# Patient Record
Sex: FEMALE | Race: WHITE
[De-identification: names, ages, dates, MRNs, and addresses within clinical notes are randomized per-mention and may not be internally consistent; named-entity substitution may affect disease eponyms.]

---

## 2017-01-27 ENCOUNTER — HOSPITAL ENCOUNTER (OUTPATIENT)
Dept: HOSPITAL 47 - RADUSWWP | Age: 42
Discharge: HOME | End: 2017-01-27
Payer: COMMERCIAL

## 2017-01-27 DIAGNOSIS — Z85.3: ICD-10-CM

## 2017-01-27 DIAGNOSIS — E01.0: Primary | ICD-10-CM

## 2017-01-27 PROCEDURE — 76536 US EXAM OF HEAD AND NECK: CPT

## 2017-01-27 NOTE — US
EXAMINATION TYPE: US thyroid st tissue head/neck

 

DATE OF EXAM: 1/27/2017 2:39 PM

 

COMPARISON: NONE

 

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

 

GLAND SIZE:

 

Right Lobe: 4.6 x 1.5 x 1.6 cm

Left Lobe: 4.1 x 1.2 x 1.5 cm

Isthmus Thickness:  0.2 cm

 

There is homogeneous glandular parenchyma without discrete nodule.

 

 

 

IMPRESSION:

Thyroid gland measurements as above. No discrete nodule.

## 2018-09-25 ENCOUNTER — HOSPITAL ENCOUNTER (OUTPATIENT)
Dept: HOSPITAL 47 - PROCWHC3 | Age: 43
End: 2018-09-25
Attending: INTERNAL MEDICINE
Payer: COMMERCIAL

## 2018-09-25 VITALS
RESPIRATION RATE: 16 BRPM | HEART RATE: 84 BPM | SYSTOLIC BLOOD PRESSURE: 154 MMHG | TEMPERATURE: 97.9 F | DIASTOLIC BLOOD PRESSURE: 101 MMHG

## 2018-09-25 DIAGNOSIS — C50.412: Primary | ICD-10-CM

## 2018-09-25 PROCEDURE — 96372 THER/PROPH/DIAG INJ SC/IM: CPT

## 2018-11-12 ENCOUNTER — HOSPITAL ENCOUNTER (OUTPATIENT)
Dept: HOSPITAL 47 - RADMAMWWP | Age: 43
Discharge: HOME | End: 2018-11-12
Attending: INTERNAL MEDICINE
Payer: COMMERCIAL

## 2018-11-12 DIAGNOSIS — Z08: Primary | ICD-10-CM

## 2018-11-12 DIAGNOSIS — Z85.3: ICD-10-CM

## 2018-11-12 PROCEDURE — 77061 BREAST TOMOSYNTHESIS UNI: CPT

## 2018-11-12 PROCEDURE — 77065 DX MAMMO INCL CAD UNI: CPT

## 2018-11-12 NOTE — MM
Reason for exam: additional evaluation requested from prior study.

Last mammogram was performed 1 year ago.



History:

Patient has history of breast cancer at age 37.

Family history of breast cancer in maternal aunt.

Benign US biopsy breast VAD RT of the right breast, November 28, 2014.  Mastectomy

of the left breast, June 5, 2012.  Malignant left mammotome panel of the left 

breast, May 9, 2012.  Implant in the left breast, 2012.  Breast lift of the right 

breast, 2012.  Reconstruction of the left breast, 2012.

Taking tamoxifen for 1 year beginning at age 37.



Physical Findings:

Nurse did not find any significant physical abnormalities on exam.



MG 3D Diag Mammo W/Cad RT

CC and MLO view(s) were taken of the right breast.

Prior study comparison: November 6, 2017, right breast MG 3d diag mammo w/cad RT. 

November 2, 2016, right breast MG diagnostic mammo RT w CAD.

There are scattered fibroglandular densities.  No significant new findings when 

compared with previous films.



These results were verbally communicated with the patient and result sheet given 

to the patient on 11/12/18.





ASSESSMENT: Benign, BI-RAD 2



RECOMMENDATION:

Follow-up diagnostic mammogram of the right breast in 1 year.

## 2018-12-27 ENCOUNTER — HOSPITAL ENCOUNTER (OUTPATIENT)
Dept: HOSPITAL 47 - PROCWHC3 | Age: 43
End: 2018-12-27
Attending: INTERNAL MEDICINE
Payer: COMMERCIAL

## 2018-12-27 VITALS
RESPIRATION RATE: 16 BRPM | SYSTOLIC BLOOD PRESSURE: 132 MMHG | TEMPERATURE: 97.7 F | DIASTOLIC BLOOD PRESSURE: 89 MMHG | HEART RATE: 86 BPM

## 2018-12-27 DIAGNOSIS — C50.412: ICD-10-CM

## 2018-12-27 DIAGNOSIS — Z51.11: Primary | ICD-10-CM

## 2018-12-27 PROCEDURE — 96402 CHEMO HORMON ANTINEOPL SQ/IM: CPT

## 2019-04-08 ENCOUNTER — HOSPITAL ENCOUNTER (OUTPATIENT)
Dept: HOSPITAL 47 - RADBDWWP | Age: 44
Discharge: HOME | End: 2019-04-08
Attending: INTERNAL MEDICINE
Payer: COMMERCIAL

## 2019-04-08 DIAGNOSIS — C50.412: Primary | ICD-10-CM

## 2019-04-08 DIAGNOSIS — Z79.890: ICD-10-CM

## 2019-04-08 DIAGNOSIS — M85.88: ICD-10-CM

## 2019-04-08 PROCEDURE — 77080 DXA BONE DENSITY AXIAL: CPT

## 2019-04-08 NOTE — BD
EXAMINATION TYPE: Axial Bone Density

 

DATE OF EXAM: 4/8/2019

 

COMPARISON: NONE

 

CLINICAL HISTORY: C 50.412

 

Height:  5'6

Weight:  197

 

FRAX RISK QUESTIONS:

 

Secondary Osteoporosis:

    3.  Menopause before 45:   n 

 

 

RISK FACTORS 

HISTORY OF: 

Family History of Osteoporosis: y

Post menopausal: Y

 

MEDICATIONS: 

Additional Medications: breast cancer not tamoxifen 

 

 

Additional History: breast  cancer 6 years ago, 

 

 

EXAM MEASUREMENTS: 

Bone mineral densitometry was performed using the [facility].

Bone mineral density as measured about the Lumbar spine is:  

----- L1-L4(G/cm2): 1.001

T Score Values are as follows:

----- L2: -1.4

----- L3: -1.5

----- L4:-2.4

----- L1-L4: -1.5

 

Bone mineral density about the R hip (g/cm2): 1.139

Bone mineral density about the L hip (g/cm2): 1.055

T Score values are as follows:

-----R Neck: 0.7

-----L Neck: 0.1

-----R Total: 0.8

-----L Total: 0.6

 

IMPRESSION:

Osteopenia (T Score between -2.5 and -1).

 

There is slightly increased risk of fracture and the patient may be considered 

for treatment. 

 

Re-Screen 2-5 years.

 

 

 

 

 

NOTE:  T-SCORE=SD OF THE YOUNG ADULT MEAN.

## 2019-06-16 ENCOUNTER — HOSPITAL ENCOUNTER (EMERGENCY)
Dept: HOSPITAL 47 - EC | Age: 44
Discharge: HOME | End: 2019-06-16
Payer: COMMERCIAL

## 2019-06-16 VITALS
SYSTOLIC BLOOD PRESSURE: 134 MMHG | DIASTOLIC BLOOD PRESSURE: 91 MMHG | HEART RATE: 90 BPM | RESPIRATION RATE: 20 BRPM | TEMPERATURE: 98.1 F

## 2019-06-16 DIAGNOSIS — R93.89: ICD-10-CM

## 2019-06-16 DIAGNOSIS — Z90.12: ICD-10-CM

## 2019-06-16 DIAGNOSIS — Z85.3: ICD-10-CM

## 2019-06-16 DIAGNOSIS — F17.200: ICD-10-CM

## 2019-06-16 DIAGNOSIS — R10.2: ICD-10-CM

## 2019-06-16 DIAGNOSIS — N93.9: Primary | ICD-10-CM

## 2019-06-16 LAB
ALBUMIN SERPL-MCNC: 4 G/DL (ref 3.5–5)
ALP SERPL-CCNC: 84 U/L (ref 38–126)
ALT SERPL-CCNC: 25 U/L (ref 9–52)
ANION GAP SERPL CALC-SCNC: 5 MMOL/L
APTT BLD: 24.8 SEC (ref 22–30)
AST SERPL-CCNC: 22 U/L (ref 14–36)
BASOPHILS # BLD AUTO: 0 K/UL (ref 0–0.2)
BASOPHILS NFR BLD AUTO: 0 %
BUN SERPL-SCNC: 20 MG/DL (ref 7–17)
CALCIUM SPEC-MCNC: 9.1 MG/DL (ref 8.4–10.2)
CHLORIDE SERPL-SCNC: 108 MMOL/L (ref 98–107)
CO2 SERPL-SCNC: 26 MMOL/L (ref 22–30)
EOSINOPHIL # BLD AUTO: 0.2 K/UL (ref 0–0.7)
EOSINOPHIL NFR BLD AUTO: 2 %
ERYTHROCYTE [DISTWIDTH] IN BLOOD BY AUTOMATED COUNT: 4.82 M/UL (ref 3.8–5.4)
ERYTHROCYTE [DISTWIDTH] IN BLOOD: 13.1 % (ref 11.5–15.5)
GLUCOSE SERPL-MCNC: 105 MG/DL (ref 74–99)
HCT VFR BLD AUTO: 43.4 % (ref 34–46)
HGB BLD-MCNC: 13.7 GM/DL (ref 11.4–16)
INR PPP: 0.9 (ref ?–1.2)
LYMPHOCYTES # SPEC AUTO: 3.3 K/UL (ref 1–4.8)
LYMPHOCYTES NFR SPEC AUTO: 36 %
MCH RBC QN AUTO: 28.4 PG (ref 25–35)
MCHC RBC AUTO-ENTMCNC: 31.5 G/DL (ref 31–37)
MCV RBC AUTO: 90.1 FL (ref 80–100)
MONOCYTES # BLD AUTO: 0.6 K/UL (ref 0–1)
MONOCYTES NFR BLD AUTO: 7 %
NEUTROPHILS # BLD AUTO: 4.9 K/UL (ref 1.3–7.7)
NEUTROPHILS NFR BLD AUTO: 52 %
PH UR: 5 [PH] (ref 5–8)
PLATELET # BLD AUTO: 367 K/UL (ref 150–450)
POTASSIUM SERPL-SCNC: 4.3 MMOL/L (ref 3.5–5.1)
PROT SERPL-MCNC: 6.9 G/DL (ref 6.3–8.2)
PT BLD: 9.9 SEC (ref 9–12)
RBC UR QL: 8 /HPF (ref 0–5)
SODIUM SERPL-SCNC: 139 MMOL/L (ref 137–145)
SP GR UR: 1.02 (ref 1–1.03)
SQUAMOUS UR QL AUTO: 2 /HPF (ref 0–4)
UROBILINOGEN UR QL STRIP: <2 MG/DL (ref ?–2)
WBC # BLD AUTO: 9.3 K/UL (ref 3.8–10.6)
WBC #/AREA URNS HPF: 6 /HPF (ref 0–5)

## 2019-06-16 PROCEDURE — 36415 COLL VENOUS BLD VENIPUNCTURE: CPT

## 2019-06-16 PROCEDURE — 80053 COMPREHEN METABOLIC PANEL: CPT

## 2019-06-16 PROCEDURE — 85730 THROMBOPLASTIN TIME PARTIAL: CPT

## 2019-06-16 PROCEDURE — 96374 THER/PROPH/DIAG INJ IV PUSH: CPT

## 2019-06-16 PROCEDURE — 81001 URINALYSIS AUTO W/SCOPE: CPT

## 2019-06-16 PROCEDURE — 85610 PROTHROMBIN TIME: CPT

## 2019-06-16 PROCEDURE — 99284 EMERGENCY DEPT VISIT MOD MDM: CPT

## 2019-06-16 PROCEDURE — 81025 URINE PREGNANCY TEST: CPT

## 2019-06-16 PROCEDURE — 76830 TRANSVAGINAL US NON-OB: CPT

## 2019-06-16 PROCEDURE — 96361 HYDRATE IV INFUSION ADD-ON: CPT

## 2019-06-16 PROCEDURE — 85025 COMPLETE CBC W/AUTO DIFF WBC: CPT

## 2019-06-16 NOTE — US
EXAMINATION TYPE: US transvaginal

 

DATE OF EXAM: 6/16/2019

 

COMPARISON: Previous study dated 12/15/2017.

 

CLINICAL HISTORY: Pain.

Patient in forced menopause on left hand.

Patient has breast ca and is currently taking medication for that as well. 

 

TECHNIQUE:  Transvaginal (TV).  

 

Date of LMP:  1 1/2 years prior.

 

EXAM MEASUREMENTS:

 

Uterus:  8.7 x 4.1 x 4.7 cm

Endometrial Stripe: 1.6 cm

Right Ovary:  1.9 x 1.3 x 1.4 cm

Left Ovary: not visualized

 

1. Uterus:  Anteverted   wnl

2. Endometrium:  thickened

3. Right Ovary:  wnl

4. Left Ovary:  Obscured by overlying bowel gas

. Bilateral Adnexa:  wnl

6. Posterior cul-de-sac: mild amount of ff

 

IMPRESSION: 

1. THICKENED ENDOMETRIUM. THIS MAY BE DUE TO HORMONAL THERAPY. ENDOMETRIAL CANCER WITH NEED TO BE RUL
ED OUT.

2. SMALL AMOUNT OF FREE FLUID.

## 2019-06-18 ENCOUNTER — HOSPITAL ENCOUNTER (OUTPATIENT)
Dept: HOSPITAL 47 - LABPAT | Age: 44
Discharge: HOME | End: 2019-06-18
Attending: OBSTETRICS & GYNECOLOGY
Payer: COMMERCIAL

## 2019-06-18 DIAGNOSIS — Z01.812: Primary | ICD-10-CM

## 2019-06-18 DIAGNOSIS — N93.9: ICD-10-CM

## 2019-06-18 LAB
BASOPHILS # BLD AUTO: 0.1 K/UL (ref 0–0.2)
BASOPHILS NFR BLD AUTO: 0 %
EOSINOPHIL # BLD AUTO: 0.4 K/UL (ref 0–0.7)
EOSINOPHIL NFR BLD AUTO: 3 %
ERYTHROCYTE [DISTWIDTH] IN BLOOD BY AUTOMATED COUNT: 4.81 M/UL (ref 3.8–5.4)
ERYTHROCYTE [DISTWIDTH] IN BLOOD: 13.8 % (ref 11.5–15.5)
HCT VFR BLD AUTO: 44.7 % (ref 34–46)
HGB BLD-MCNC: 13.9 GM/DL (ref 11.4–16)
LYMPHOCYTES # SPEC AUTO: 3.9 K/UL (ref 1–4.8)
LYMPHOCYTES NFR SPEC AUTO: 35 %
MCH RBC QN AUTO: 29 PG (ref 25–35)
MCHC RBC AUTO-ENTMCNC: 31.2 G/DL (ref 31–37)
MCV RBC AUTO: 92.9 FL (ref 80–100)
MONOCYTES # BLD AUTO: 0.8 K/UL (ref 0–1)
MONOCYTES NFR BLD AUTO: 7 %
NEUTROPHILS # BLD AUTO: 5.8 K/UL (ref 1.3–7.7)
NEUTROPHILS NFR BLD AUTO: 52 %
PLATELET # BLD AUTO: 404 K/UL (ref 150–450)
WBC # BLD AUTO: 11.2 K/UL (ref 3.8–10.6)

## 2019-06-18 PROCEDURE — 85025 COMPLETE CBC W/AUTO DIFF WBC: CPT

## 2019-06-18 PROCEDURE — 36415 COLL VENOUS BLD VENIPUNCTURE: CPT

## 2019-07-01 ENCOUNTER — HOSPITAL ENCOUNTER (OUTPATIENT)
Dept: HOSPITAL 47 - OR | Age: 44
End: 2019-07-01
Attending: OBSTETRICS & GYNECOLOGY
Payer: COMMERCIAL

## 2019-07-01 VITALS — HEART RATE: 64 BPM | SYSTOLIC BLOOD PRESSURE: 113 MMHG | DIASTOLIC BLOOD PRESSURE: 72 MMHG

## 2019-07-01 VITALS — RESPIRATION RATE: 18 BRPM

## 2019-07-01 VITALS — BODY MASS INDEX: 28.1 KG/M2

## 2019-07-01 VITALS — TEMPERATURE: 97 F

## 2019-07-01 DIAGNOSIS — N81.6: ICD-10-CM

## 2019-07-01 DIAGNOSIS — Z80.49: ICD-10-CM

## 2019-07-01 DIAGNOSIS — Z79.890: ICD-10-CM

## 2019-07-01 DIAGNOSIS — N92.6: ICD-10-CM

## 2019-07-01 DIAGNOSIS — N81.10: ICD-10-CM

## 2019-07-01 DIAGNOSIS — N84.0: Primary | ICD-10-CM

## 2019-07-01 DIAGNOSIS — Z90.12: ICD-10-CM

## 2019-07-01 DIAGNOSIS — Z87.891: ICD-10-CM

## 2019-07-01 DIAGNOSIS — Z98.51: ICD-10-CM

## 2019-07-01 DIAGNOSIS — Z83.3: ICD-10-CM

## 2019-07-01 DIAGNOSIS — N93.9: ICD-10-CM

## 2019-07-01 DIAGNOSIS — Z85.3: ICD-10-CM

## 2019-07-01 PROCEDURE — 81025 URINE PREGNANCY TEST: CPT

## 2019-07-01 PROCEDURE — 58558 HYSTEROSCOPY BIOPSY: CPT

## 2019-07-01 PROCEDURE — 88305 TISSUE EXAM BY PATHOLOGIST: CPT

## 2019-07-01 NOTE — P.OP
Date of Procedure: 07/01/19


Preoperative Diagnosis: 


Irregular uterine bleeding


Postoperative Diagnosis: 


Endometrial polyp, grade 2 rectocele, grade 2 cystocele


Procedure(s) Performed: 


Hysteroscopy, dilatation and curettage of the uterine cavity, polypectomy


Anesthesia: BUTCH


Surgeon: Willow Baker


Estimated Blood Loss (ml): 10


IV fluids (ml): 600


Urine output (ml): 20


Pathology: other (Endometrial curettings and polyp)


Condition: stable


Disposition: PACU


Operative Findings: 


Grade 2 rectocele, grade 2 cystocele, 1 dominant endometrial polyp


Description of Procedure: 


Patient is brought to the operating suite where general anesthetic is 

administered without difficulty.  She's placed in the dorsal lithotomy position.

 The appropriate timeout was performed to assure proper patient and procedural 

identification.  Urine hCG is negative.  The cervix, vagina, perineal bodies are

all prepped and draped in the usual sterile fashion.  The bladder is drained for

approximately 20 cc of clear yellow urine.  The weighted speculum was placed 

into the vagina.  The anterior lip of the cervix is grasped with a double-tooth 

tenaculum.





Uterus sounds to a depth of 7 cm in the anteverted position.  The cervix was 

gently and carefully dilated using Hanks dilators.  The hysteroscope was placed 

and fluid is infused.  The cavity is well distended and inspected.  There is a 

smooth walled, rounded polyp noted in the lower uterine segment at approximately

3:00.  The remaining view of the cavity is negative.  The hysteroscope was 

removed.  A small sharp curette is used and the cavity is gently and 

systematically curetted.  The endometrial polyp is removed.  This is sent to 

pathology for evaluation.





The hysteroscope was then reintroduced, and the cavity is noted to be clear with

no additional polyps, septa, fibroids or defects.  The double-tooth tenaculum is

also removed.  A small figure-of-eight suture of 3-0 Vicryl is used on the 

perforation site for a small amount of bleeding with excellent results.  All 

sponge needle and enhancement counts are correct.  Patient is brought back to 

the recovery room in excellent condition with stable vital signs including a 

blood pressure of 115/72, pulse 73, 97% O2 saturation.  She is given Toradol 

prior to leaving the operative suite.  She will follow-up with me in the office 

in 2 weeks.

## 2019-10-29 ENCOUNTER — HOSPITAL ENCOUNTER (OUTPATIENT)
Dept: HOSPITAL 47 - RADXRMAIN | Age: 44
Discharge: HOME | End: 2019-10-29
Payer: COMMERCIAL

## 2019-10-29 ENCOUNTER — HOSPITAL ENCOUNTER (EMERGENCY)
Dept: HOSPITAL 47 - EC | Age: 44
Discharge: HOME | End: 2019-10-29
Payer: COMMERCIAL

## 2019-10-29 VITALS — DIASTOLIC BLOOD PRESSURE: 73 MMHG | SYSTOLIC BLOOD PRESSURE: 142 MMHG | TEMPERATURE: 98 F | HEART RATE: 80 BPM

## 2019-10-29 VITALS — RESPIRATION RATE: 18 BRPM
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
## 2019-10-29 DIAGNOSIS — Z90.12: ICD-10-CM

## 2019-10-29 DIAGNOSIS — Z85.3: ICD-10-CM
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
## 2019-10-29 DIAGNOSIS — S60.211A: ICD-10-CM
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
## 2019-10-29 DIAGNOSIS — S60.221A: ICD-10-CM

## 2019-10-29 DIAGNOSIS — S63.501A: Primary | ICD-10-CM

## 2019-10-29 DIAGNOSIS — F17.200: ICD-10-CM

## 2019-10-29 DIAGNOSIS — S50.01XA: Primary | ICD-10-CM

## 2019-10-29 DIAGNOSIS — Z79.899: ICD-10-CM

## 2019-10-29 PROCEDURE — 73200 CT UPPER EXTREMITY W/O DYE: CPT

## 2019-10-29 PROCEDURE — 96372 THER/PROPH/DIAG INJ SC/IM: CPT

## 2019-10-29 PROCEDURE — 29125 APPL SHORT ARM SPLINT STATIC: CPT

## 2019-10-29 PROCEDURE — 99284 EMERGENCY DEPT VISIT MOD MDM: CPT

## 2019-10-29 NOTE — XR
EXAMINATION TYPE: XR wrist complete RT, XR forearm RT, XR hand complete RT

 

DATE OF EXAM: 10/29/2019

 

CLINICAL HISTORY: Right wrist pain after fall and right hand pain as well as right forearm pain.

 

TECHNIQUE:  Frontal, lateral and oblique images of the right wrist are obtained. Scaphoid view was ob
tained. 2 views of the right forearm are obtained. 3 views of the right hand were obtained.

 

COMPARISON: None

 

FINDINGS:  There is no acute fracture/dislocation evident in the right wrist.  The joint spaces in th
e right wrist appear within normal limits.  The overlying soft tissue appears unremarkable. Slight ne
gative ulnar variance is seen.

 

No acute fracture or dislocation is seen in the right hand. Suboptimal visualization of the fourth an
d fifth digit on the oblique view due to patient positioning. Minimal degenerative change of the firs
t carpometacarpal joint and distal interphalangeal joints.

 

No acute fracture or dislocation is seen of the right forearm. No radiopaque foreign body. No joint e
ffusion of the elbow. No focal soft tissue swelling.

 

IMPRESSION:  There is no acute fracture or dislocation in the right wrist, forearm, nor hand. Given t
he patient's severe pain correlate for point tenderness to determine need for CT and evaluation for o
ccult fracture or repeat radiograph in 7-10 days.

## 2019-10-29 NOTE — CT
EXAMINATION TYPE: CT wrist RT wo con

 

DATE OF EXAM: 10/29/2019

 

COMPARISON: Radiograph same day

 

HISTORY: 44-year-old female trauma, severe pain

 

TECHNIQUE: Contiguous axial scanning of the right wrist without IV contrast. Coronal and sagittal rec
onstructions performed.

 

CT DLP: 107.1 mGycm

Automated exposure control for dose reduction was used.

 

 

FINDINGS: 

There is some generalized soft tissue swelling about the hand.

 

Subtle cortical lucency along the lateral scaphoid marginating the radial carpal joint, refer to sanjiv
nal image 21 and sagittal image 37.

 

Tiny spur at the volar base of the first metacarpal.

 

Otherwise, no acute fracture, subluxation, or dislocation seen.

 

 

IMPRESSION: 

 

1. SOME GENERALIZED SOFT TISSUE SWELLING ABOUT THE HAND.

2. SUBTLE CORTICAL LUCENCY ALONG THE LATERAL SCAPHOID MARGINATING THE RADIOCARPAL JOINT, CORONAL IMAG
E 21 AND SAGITTAL IMAGE 37. EQUIVOCAL BETWEEN a NUTRIENT VESSEL FORAMEN VERSUS SUBTLE NONDISPLACED CO
RTICAL FRACTURE. CORRELATE FOR ANY PINPOINT TENDERNESS HERE.

## 2019-10-29 NOTE — ED
General Adult HPI





- General


Chief complaint: Extremity Injury, Upper


Stated complaint: [facility]-Hand Injury


Time Seen by Provider: 10/29/19 14:35


Source: patient


Mode of arrival: ambulatory


Limitations: no limitations





- History of Present Illness


Initial comments: 





Patient is a 44-year-old female presenting to the emergency department with 

chief complaint of right arm pain.  Patient reports yesterday she was at work 

when she tripped and fell on the right side causing her right arm to be pinned 

between the ground and her body.  Patient reports the pain is originating at the

right breast and he is radiating distally and proximally along the arm.  Patient

reports limited range of motion in the right wrist with the inability to flex or

extend it.  Patient reports the pain is a 9 and throbbing.  Patient denies any 

numbness or tingling.  Patient went to [facility] who ordered x-rays.  She had x-rays 

obtained today who were indicated of no fractures however the radiologist 

suggested a CT to perform which is why she was sent to the ED.  Patient reports 

she still continues to have a lot of pain and no improvement in her symptoms.  

She really has an appointment scheduled tomorrow with orthopedic specialist.





- Related Data


                                Home Medications











 Medication  Instructions  Recorded  Confirmed


 


Multivitamins, Thera [Multivitamin 1 tab PO DAILY 12/15/17 07/01/19





(formulary)]   


 


Letrozole 2.5 mg PO DAILY 19











                                    Allergies











Allergy/AdvReac Type Severity Reaction Status Date / Time


 


No Known Allergies Allergy   Verified 10/29/19 14:28














Review of Systems


ROS Statement: 


Those systems with pertinent positive or pertinent negative responses have been 

documented in the HPI.





ROS Other: All systems not noted in ROS Statement are negative.





Past Medical History


Past Medical History: Cancer


Additional Past Medical History / Comment(s): Breast CA


History of Any Multi-Drug Resistant Organisms: None Reported


Past Surgical History: Breast Surgery,  Section, Tubal Ligation


Additional Past Surgical History / Comment(s): LEFT MASTECTOMY


Past Anesthesia/Blood Transfusion Reactions: No Reported Reaction


Past Psychological History: No Psychological Hx Reported


Smoking Status: Current some day smoker


Past Alcohol Use History: None Reported


Past Drug Use History: None Reported





General Exam


Limitations: no limitations


General appearance: alert, in no apparent distress


Head exam: Present: atraumatic, normocephalic, normal inspection


Eye exam: Present: normal appearance


Pupils: Present: normal accommodation


ENT exam: Present: normal exam, mucous membranes moist, normal external ear exam


Neck exam: Present: normal inspection


Respiratory exam: Present: normal lung sounds bilaterally


Cardiovascular Exam: Present: regular rate, normal rhythm, normal heart sounds


Extremities exam: Present: tenderness (Tenderness along the whole wrist.  

Anatomical snuffbox tenderness.  Distal forearm tenderness), normal capillary 

refill, other (+2 ulnar and radial pulses bilaterally.).  Absent: normal 

inspection (Very Mild swelling at the right wrist, no ecchymosis, discoloration 

or signs of trauma.), full ROM (Unable to flex or extend the wrist)


Back exam: Present: normal inspection, full ROM


Neurological exam: Present: alert, oriented X3


Psychiatric exam: Present: normal affect, normal mood


Skin exam: Present: warm, intact, normal color





Course


                                   Vital Signs











  10/29/19 10/29/19





  14:28 16:21


 


Temperature 97.7 F 98.0 F


 


Pulse Rate 86 80


 


Respiratory 18 18





Rate  


 


Blood Pressure 159/71 142/73


 


O2 Sat by Pulse 98 98





Oximetry  














Procedures





- Orthopedic Splinting/Casting


  ** Injury #1


Side: right


Upper Extremity Injury Location: wrist


Upper Extremity Immobilizer: thumb spica, Ace wrap, synthetic pre-padded splint





Medical Decision Making





- Medical Decision Making





Patient is a 44-year-old female presenting to the emergency department with a 

chief complaint of right arm pain.  Patient was sent for x-ray imaging of the 

right upper extremity by [facility].  X-rays are negative for acute fracture or 

dislocations, however the patient continues to have severe pain.  The 

radiologist suggested CT imaging.  CT of the right wrist is indeterminate of the

fracture versus possible vessel damage.  A physical examination patient does 

have normal capillary refill with +2 radial pulses in that hand.  On physical 

examination no signs of vascular damage.  Patient already has an appointment 

scheduled with orthopedics.  Patient declined narcotic analgesia.  Patient was 

given Toradol.  Patient advised to follow-up with orthopedics.  Strict return 

parameters were thoroughly discussed with patient is understanding and 

agreeable.  Thumb spica applied.  Case discussed with physician.





Disposition


Clinical Impression: 


 Sprain of wrist, right





Disposition: HOME SELF-CARE


Condition: Stable


Instructions (If sedation given, give patient instructions):  Wrist Injury (ED)


Additional Instructions: 


Please follow up with orthopedics tomorrow.  Please return to emergency 

department if symptoms worsen.  Please take medication as directed.


Is patient prescribed a controlled substance at d/c from ED?: No


Referrals: 


Farideh Shepard MD [Primary Care Provider] - 1-2 days


Osbaldo Hung MD [STAFF PHYSICIAN] - 1-2 days


Time of Disposition: 16:13

## 2019-11-13 ENCOUNTER — HOSPITAL ENCOUNTER (OUTPATIENT)
Dept: HOSPITAL 47 - RADMAMWWP | Age: 44
Discharge: HOME | End: 2019-11-13
Attending: INTERNAL MEDICINE
Payer: COMMERCIAL

## 2019-11-13 DIAGNOSIS — R92.8: ICD-10-CM

## 2019-11-13 DIAGNOSIS — Z85.3: ICD-10-CM

## 2019-11-13 DIAGNOSIS — Z08: Primary | ICD-10-CM

## 2019-11-13 PROCEDURE — 77065 DX MAMMO INCL CAD UNI: CPT

## 2019-11-13 PROCEDURE — 77061 BREAST TOMOSYNTHESIS UNI: CPT

## 2019-11-13 NOTE — MM
Reason for exam: additional evaluation requested from prior study.

Last mammogram was performed 1 year ago.



History:

Patient has history of breast cancer at age 37.

Family history of breast cancer in maternal aunt at age 50.

Benign US biopsy breast VAD RT of the right breast, November 28, 2014.  Mastectomy

of the left breast, June 5, 2012.  Malignant left mammotome panel of the left 

breast, May 9, 2012.  Implant in the left breast, 2012.  Breast lift of the right 

breast, 2012.  Reconstruction of the left breast, 2012.

Taking tamoxifen for 7 years beginning at age 37.



Physical Findings:

Nurse did not find any significant physical abnormalities on exam.



MG 3D Diag Mammo W/Cad RT

CC and MLO view(s) were taken of the right breast.

Prior study comparison: November 12, 2018, right breast MG 3d diag mammo w/cad RT.

November 6, 2017, right breast MG 3d diag mammo w/cad RT.

There are scattered fibroglandular densities.  Benign appearing calcifications in 

the right breast. No suspicious abnormality.



These results were verbally communicated with the patient and result sheet given 

to the patient on 11/13/19.





ASSESSMENT: Incomplete: need additional imaging evaluation, BI-RAD 0



RECOMMENDATION:

Ultrasound of the right breast.

(pain lower inner quadrant)

## 2019-11-13 NOTE — USB
Reason for exam: additional evaluation requested from abnormal screening.



History:

Patient has history of breast cancer at age 37.

Family history of breast cancer in maternal aunt at age 50.

Benign US biopsy breast VAD RT of the right breast, November 28, 2014.  Mastectomy

of the left breast, June 5, 2012.  Malignant left mammotome panel of the left 

breast, May 9, 2012.  Implant in the left breast, 2012.  Breast lift of the right 

breast, 2012.  Reconstruction of the left breast, 2012.

Taking tamoxifen for 7 years beginning at age 37.



US Breast Limited RT

Right limited breast ultrasound including focal area of concern, retroareolar and 

axilla demonstrates no cystic or solid lesion seen. No suspicious sonographic 

finding.



These results were verbally communicated with the patient and result sheet given 

to the patient on 11/13/19.





ASSESSMENT: Negative, BI-RAD 1



RECOMMENDATION:

Follow-up diagnostic mammogram of the right breast in 1 year.

## 2020-02-13 ENCOUNTER — HOSPITAL ENCOUNTER (OUTPATIENT)
Dept: HOSPITAL 47 - RADUSWWP | Age: 45
Discharge: HOME | End: 2020-02-13
Attending: FAMILY MEDICINE
Payer: COMMERCIAL

## 2020-02-13 DIAGNOSIS — E04.1: Primary | ICD-10-CM

## 2020-02-13 PROCEDURE — 76536 US EXAM OF HEAD AND NECK: CPT

## 2020-08-14 ENCOUNTER — HOSPITAL ENCOUNTER (OUTPATIENT)
Dept: HOSPITAL 47 - RADUSWWP | Age: 45
Discharge: HOME | End: 2020-08-14
Attending: FAMILY MEDICINE
Payer: COMMERCIAL

## 2020-08-14 DIAGNOSIS — R59.0: Primary | ICD-10-CM

## 2020-08-14 DIAGNOSIS — E04.1: ICD-10-CM

## 2020-08-14 PROCEDURE — 76536 US EXAM OF HEAD AND NECK: CPT

## 2020-08-14 NOTE — US
EXAMINATION TYPE: US thyroid st tissue head/neck

 

DATE OF EXAM: 8/14/2020

 

COMPARISON: 2/13/2020

 

CLINICAL HISTORY: 45-year-old female E04.1 Thyroid nodule. Follow-up, no thyroid meds 

 

 

TECHNIQUE: Multiple sonographic images of the thyroid gland are obtained.

 

FINDINGS:

GLAND SIZE:

 

Right Lobe: 4.6 x 1.3 x 1.4 cm

** Overall Parenchyma:  homogenous

Left Lobe: 4.3 x 1.4 x 1.4 cm

** Overall Parenchyma:  homogeneous

Isthmus Thickness:  0.3 cm

 

NODULES

 

RIGHT:   # of nodules measured on right: 0

 

LEFT:    # of nodules measured on left:  0

- Previous seen cystic lesion not visualized on today exam

 

ISTHMUS:    # of nodules measured in the isthmus:  0

 

Bilateral neck scanned.  Prominent right lymph node seen lateral to right thyroid lobe- 2.3 x 1.2 x 0
.7 cm.  Two prominent lymph nodes seen superior to left thyroid lobe = 1.6 x 1.0 x 0.6 cm and 1.6 x 0
.9 x 0.9 cm. 

 

 

 

 

 

IMPRESSION:

 

1. Unremarkable ultrasound of the thyroid gland.

2. A few borderline sized cervical lymph nodes on either side, largest measuring 1.2 cm short axis. T
hese remain prominent but nonenlarged by size criteria at this time. Probably reactive/post inflammat
ory. Clinical follow-up recommended. If there is any enlargement, the neck should be rescanned.

## 2020-10-05 ENCOUNTER — HOSPITAL ENCOUNTER (OUTPATIENT)
Dept: HOSPITAL 47 - RADCTMAIN | Age: 45
Discharge: HOME | End: 2020-10-05
Attending: OTOLARYNGOLOGY
Payer: COMMERCIAL

## 2020-10-05 DIAGNOSIS — K11.8: Primary | ICD-10-CM

## 2020-10-05 DIAGNOSIS — R59.0: ICD-10-CM

## 2020-10-05 PROCEDURE — 70491 CT SOFT TISSUE NECK W/DYE: CPT

## 2020-10-05 NOTE — CT
EXAMINATION TYPE: CT soft tissue neck w con

 

DATE OF EXAM: 10/5/2020

 

COMPARISON: None

 

HISTORY: enlarged lymph nodes

 

CT DLP: 663 mGycm

 

CONTRAST: 

CT scan of the neck is performed with IV Contrast, patient injected with 100 mL of Isovue 300.

 

Contrast enhanced CT of the neck was performed from the skull base through the lung apices.

 

AIRWAY:   The supraglottic, glottic, and subglottic portions of the airway appear patent and free of 
mass.

 

SALIVARY GLANDS: 1.8 cm solid lesion noted within the left parotid gland inferior lobe. Small solid l
esion noted within the right parotid gland measuring 5.5 mm with fatty hilum may reflect a lymph node
. Several subcentimeter internal jugular lymph nodes are noted on the left as well as on the right.

 

THYROID GLAND:  No nodules or masses seen.

 

LYMPH NODES: Several subcentimeter internal jugular lymph nodes are noted on the left as well as on t
he right.

 

 

LUNG APICES:  No nodule or mass is seen.

 

OTHER:  Vascular structures are patent.  No significant degenerative change of the cervical spine.  N
o abscess seen.

 

IMPRESSION:

1. None nonspecific 1.8 cm intraparotid lesion left parotid gland. Additional smaller solid lesion ri
ght parotid gland.

2. Subcentimeter lymph nodes within the internal jugular chains bilaterally.

## 2020-11-16 ENCOUNTER — HOSPITAL ENCOUNTER (OUTPATIENT)
Dept: HOSPITAL 47 - RADMAMWWP | Age: 45
Discharge: HOME | End: 2020-11-16
Attending: INTERNAL MEDICINE
Payer: COMMERCIAL

## 2020-11-16 DIAGNOSIS — Z90.12: ICD-10-CM

## 2020-11-16 DIAGNOSIS — Z12.31: Primary | ICD-10-CM

## 2020-11-16 PROCEDURE — 77067 SCR MAMMO BI INCL CAD: CPT

## 2020-11-17 NOTE — MM
Reason for exam: screening  (asymptomatic).

Last mammogram was performed 1 year ago.



History:

Patient has history of breast cancer at age 37.

Family history of breast cancer in maternal aunt at age 50.

Benign US biopsy breast VAD RT of the right breast, November 28, 2014.  Mastectomy

of the left breast, June 5, 2012.  Malignant left mammotome panel of the left 

breast, May 9, 2012.  Implant in the left breast, 2012.  Breast lift of the right 

breast, 2012.  Reconstruction of the left breast, 2012.

Taking tamoxifen for 7 years beginning at age 37.



Physical Findings:

A clinical breast exam by your physician is recommended on an annual basis and 

results should be correlated with mammographic findings.



MG 3D Scr Leah Unilateral W/Cad

CC and MLO view(s) were taken of the right breast.

Prior study comparison: November 13, 2019, right breast MG 3d diag mammo w/cad RT.

November 12, 2018, right breast MG 3d diag mammo w/cad RT.  November 6, 2017, 

right breast MG 3d diag mammo w/cad RT.

There are scattered fibroglandular densities.  Asymmetric breast tissue right 

upper outer quadrant 8-9cm from nipple.

This finding is changed when compared with previous exams.





ASSESSMENT: Incomplete: need additional imaging evaluation, BI-RAD 0



RECOMMENDATION:

Special view mammogram of the right breast.



If lesion persists on supplemental views, image directed ultrasound is 

recommended.



[facility] will attempt to contact patient to return for supplemental 

views and ultrasound if indicated.

## 2020-12-03 ENCOUNTER — HOSPITAL ENCOUNTER (OUTPATIENT)
Dept: HOSPITAL 47 - RADMAMWWP | Age: 45
Discharge: HOME | End: 2020-12-03
Attending: INTERNAL MEDICINE
Payer: COMMERCIAL

## 2020-12-03 DIAGNOSIS — R92.8: Primary | ICD-10-CM

## 2020-12-03 PROCEDURE — 77061 BREAST TOMOSYNTHESIS UNI: CPT

## 2020-12-03 PROCEDURE — 77065 DX MAMMO INCL CAD UNI: CPT

## 2020-12-04 NOTE — MM
Reason for exam: additional evaluation requested from abnormal screening.

Last mammogram was performed 1 month ago.



History:

Patient has history of breast cancer at age 37.

Family history of breast cancer in maternal aunt at age 50.

Benign US biopsy breast VAD RT of the right breast, November 28, 2014.  Mastectomy

of the left breast, June 5, 2012.  Malignant left mammotome panel of the left 

breast, May 9, 2012.  Implant in the left breast, 2012.  Breast lift of the right 

breast, 2012.  Reconstruction of the left breast, 2012.

Taking tamoxifen for 7 years beginning at age 37.



Physical Findings:

Nurse did not find any significant physical abnormalities on exam.



MG 3D Work Up W/Cad RT

Spot compression CC and spot compression MLO view(s) were taken of the right 

breast.

Prior study comparison: November 16, 2020, bilateral MG 3d scr gabriela unilateral 

w/cad.  November 13, 2019, right breast MG 3d diag mammo w/cad RT.  November 13, 2019, right breast US breast limited RT.  November 12, 2018, right breast MG 3d 

diag mammo w/cad RT.  November 6, 2017, right breast MG 3d diag mammo w/cad RT.  

November 2, 2016, right breast MG diagnostic mammo RT w CAD.

Isodense central 9mm lobulated nodularity middle to posterior depth not seen on 

priors. Questioned oval upper outer quadrant focal asymmetry seems to localize to 

the skin and can be reassessed in 6 months.



These results were verbally communicated with the patient and result sheet given 

to the patient on 12/3/20.





ASSESSMENT: Incomplete: need additional imaging evaluation, BI-RAD 0



RECOMMENDATION:

Ultrasound of the right breast.

(retro and periareolar)

## 2020-12-04 NOTE — USB
Reason for exam: additional evaluation requested from abnormal screening.



History:

Patient has history of breast cancer at age 37.

Family history of breast cancer in maternal aunt at age 50.

Benign US biopsy breast VAD RT of the right breast, November 28, 2014.  Mastectomy

of the left breast, June 5, 2012.  Malignant left mammotome panel of the left 

breast, May 9, 2012.  Implant in the left breast, 2012.  Breast lift of the right 

breast, 2012.  Reconstruction of the left breast, 2012.

Taking tamoxifen for 7 years beginning at age 37.



US Breast Workup Limited RT

Right limited breast ultrasound including focal area of concern, retroareolar and 

axilla demonstrates  a 0.2 x 0.3 x 0.3cm oval, cystic lesion at 12 o'clock, a 0.2 

x 0.3 x 0.2cm oval, cystic lesion at 12 o'clock, a 0.3 x 0.3 x 0.2cm oval, cystic 

cluster at 4 o'clock, duct ectasia at 7 o'clock, a 0.5 x 0.7 x 0.3cm oval, cystic 

lesion at 9 o'clock and a 2.3 x 1.8 x 1.1cm oval lymph node at the axilla, 

prominent but benign appearing. No clear correlate to the mammogram. Scanned 

subareolar and periareolar and axilla.



These results were verbally communicated with the patient and result sheet given 

to the patient on 12/3/20.





ASSESSMENT: Probably benign, BI-RAD 3



RECOMMENDATION:

Follow-up diagnostic mammogram of the right breast in 6 months.

## 2021-01-28 ENCOUNTER — HOSPITAL ENCOUNTER (OUTPATIENT)
Dept: HOSPITAL 47 - RADUSWWP | Age: 46
Discharge: HOME | End: 2021-01-28
Attending: FAMILY MEDICINE
Payer: COMMERCIAL

## 2021-01-28 DIAGNOSIS — R22.9: Primary | ICD-10-CM

## 2021-01-29 NOTE — USB
Reason for exam: clinical finding.



History:

Patient has history of breast cancer at age 37.

Family history of breast cancer in maternal aunt at age 50.

Benign US biopsy breast VAD RT of the right breast, November 28, 2014.  Mastectomy

of the left breast, June 5, 2012.  Malignant left mammotome panel of the left 

breast, May 9, 2012.  Implant in the left breast, 2012.  Breast lift of the right 

breast, 2012.  Reconstruction of the left breast, 2012.

Taking tamoxifen for 7 years beginning at age 37.

Indicated problem(s): large axillary lymph nodes and pain in the right breast.



Physical Findings:

Nurse Summary: fullness in right axilla, painful (nurse dw).



US Breast Axilla RT

Right breast axilla ultrasound demonstrates a 0.9cm oval lymph node in area of 

pain, increased blood flow, 2.8 x 0.9 x 0.9cm versus 2.3 x 1.8 x 1.1cm previously.

Lobulated cortex up to 4.5mm thick versus 2mm previously. Given hyperemia this 

may be reactive. Reassess in 6-8 weeks.



These results were verbally communicated with the patient and result sheet given 

to the patient on 1/28/21.





ASSESSMENT: Probably benign, BI-RAD 3



RECOMMENDATION:

Ultrasound of the right breast in 2 months.

(6-8 weeks, biopsy in increasing thickness)

## 2021-03-23 ENCOUNTER — HOSPITAL ENCOUNTER (OUTPATIENT)
Dept: HOSPITAL 47 - LABWHC1 | Age: 46
Discharge: HOME | End: 2021-03-23
Attending: FAMILY MEDICINE
Payer: COMMERCIAL

## 2021-03-23 DIAGNOSIS — Z20.822: Primary | ICD-10-CM

## 2021-03-29 ENCOUNTER — HOSPITAL ENCOUNTER (OUTPATIENT)
Dept: HOSPITAL 47 - RADUSWWP | Age: 46
Discharge: HOME | End: 2021-03-29
Attending: FAMILY MEDICINE
Payer: COMMERCIAL

## 2021-03-29 DIAGNOSIS — Z85.3: Primary | ICD-10-CM

## 2021-03-29 NOTE — USB
Reason for exam: clinical finding.



History:

Patient has history of breast cancer at age 37.

Family history of breast cancer in maternal aunt at age 50.

Benign US biopsy breast VAD RT of the right breast, November 28, 2014.  Mastectomy

of the left breast, June 5, 2012.  Malignant left mammotome panel of the left 

breast, May 9, 2012.  Implant in the left breast, 2012.  Breast lift of the right 

breast, 2012.  Reconstruction of the left breast, 2012.

Taking tamoxifen for 7 years beginning at age 37.

Indicated problem(s): lump or thickening in the right breast.



Physical Findings:

Nurse Summary: thickening at right under, tender to touch, redness noted under 

right breast, in fold, patient states redness is new (nurse quoc).



US Breast Axilla RT

Technologist: Damaris Lewis

Right breast axilla ultrasound redemonstrates a 2.6 x 0.9 x 0.9cm lymph node at 

the axilla, versus 2.8 x 0.9 x 0.9cm previously, focal cortical thickening 4.2mm 

unchanged for 2 months. 4 month follow up ultrasound recommended which would be 

the 6 month follow up from 12/3/20.



These results were verbally communicated with the patient and result sheet given 

to the patient on 3/29/21.





ASSESSMENT: Probably benign, BI-RAD 3



RECOMMENDATION:

Ultrasound of the right breast in 4 months.

## 2021-04-13 ENCOUNTER — HOSPITAL ENCOUNTER (OUTPATIENT)
Dept: HOSPITAL 47 - LABPAT | Age: 46
Discharge: HOME | End: 2021-04-13
Attending: OBSTETRICS & GYNECOLOGY
Payer: COMMERCIAL

## 2021-04-13 DIAGNOSIS — Z01.812: Primary | ICD-10-CM

## 2021-04-13 DIAGNOSIS — Z20.822: ICD-10-CM

## 2021-04-13 DIAGNOSIS — C50.919: ICD-10-CM

## 2021-04-13 LAB
ANION GAP SERPL CALC-SCNC: 7 MMOL/L
BASOPHILS # BLD AUTO: 0 K/UL (ref 0–0.2)
BASOPHILS NFR BLD AUTO: 0 %
BUN SERPL-SCNC: 16 MG/DL (ref 7–17)
CHLORIDE SERPL-SCNC: 102 MMOL/L (ref 98–107)
CO2 SERPL-SCNC: 30 MMOL/L (ref 22–30)
EOSINOPHIL # BLD AUTO: 0.2 K/UL (ref 0–0.7)
EOSINOPHIL NFR BLD AUTO: 2 %
ERYTHROCYTE [DISTWIDTH] IN BLOOD BY AUTOMATED COUNT: 4.96 M/UL (ref 3.8–5.4)
ERYTHROCYTE [DISTWIDTH] IN BLOOD: 12.5 % (ref 11.5–15.5)
GLUCOSE SERPL-MCNC: 103 MG/DL (ref 74–99)
HCT VFR BLD AUTO: 43.8 % (ref 34–46)
HGB BLD-MCNC: 15 GM/DL (ref 11.4–16)
LYMPHOCYTES # SPEC AUTO: 3.7 K/UL (ref 1–4.8)
LYMPHOCYTES NFR SPEC AUTO: 37 %
MCH RBC QN AUTO: 30.3 PG (ref 25–35)
MCHC RBC AUTO-ENTMCNC: 34.4 G/DL (ref 31–37)
MCV RBC AUTO: 88.2 FL (ref 80–100)
MONOCYTES # BLD AUTO: 0.7 K/UL (ref 0–1)
MONOCYTES NFR BLD AUTO: 7 %
NEUTROPHILS # BLD AUTO: 5.3 K/UL (ref 1.3–7.7)
NEUTROPHILS NFR BLD AUTO: 53 %
PLATELET # BLD AUTO: 343 K/UL (ref 150–450)
POTASSIUM SERPL-SCNC: 4 MMOL/L (ref 3.5–5.1)
SODIUM SERPL-SCNC: 139 MMOL/L (ref 137–145)
WBC # BLD AUTO: 10.1 K/UL (ref 3.8–10.6)

## 2021-04-13 PROCEDURE — 84520 ASSAY OF UREA NITROGEN: CPT

## 2021-04-13 PROCEDURE — 87086 URINE CULTURE/COLONY COUNT: CPT

## 2021-04-13 PROCEDURE — 36415 COLL VENOUS BLD VENIPUNCTURE: CPT

## 2021-04-13 PROCEDURE — 82947 ASSAY GLUCOSE BLOOD QUANT: CPT

## 2021-04-13 PROCEDURE — 85025 COMPLETE CBC W/AUTO DIFF WBC: CPT

## 2021-04-13 PROCEDURE — 82565 ASSAY OF CREATININE: CPT

## 2021-04-13 PROCEDURE — 80051 ELECTROLYTE PANEL: CPT

## 2021-04-20 ENCOUNTER — HOSPITAL ENCOUNTER (OUTPATIENT)
Dept: HOSPITAL 47 - OR | Age: 46
LOS: 1 days | Discharge: HOME | End: 2021-04-21
Attending: OBSTETRICS & GYNECOLOGY
Payer: COMMERCIAL

## 2021-04-20 VITALS — RESPIRATION RATE: 16 BRPM

## 2021-04-20 VITALS — BODY MASS INDEX: 32.8 KG/M2

## 2021-04-20 DIAGNOSIS — D27.1: Primary | ICD-10-CM

## 2021-04-20 DIAGNOSIS — Z82.49: ICD-10-CM

## 2021-04-20 DIAGNOSIS — Z20.822: ICD-10-CM

## 2021-04-20 DIAGNOSIS — Z80.49: ICD-10-CM

## 2021-04-20 DIAGNOSIS — Z17.0: ICD-10-CM

## 2021-04-20 DIAGNOSIS — Z85.3: ICD-10-CM

## 2021-04-20 DIAGNOSIS — Z83.3: ICD-10-CM

## 2021-04-20 PROCEDURE — 87635 SARS-COV-2 COVID-19 AMP PRB: CPT

## 2021-04-20 PROCEDURE — 86901 BLOOD TYPING SEROLOGIC RH(D): CPT

## 2021-04-20 PROCEDURE — 88305 TISSUE EXAM BY PATHOLOGIST: CPT

## 2021-04-20 PROCEDURE — 81025 URINE PREGNANCY TEST: CPT

## 2021-04-20 PROCEDURE — 94640 AIRWAY INHALATION TREATMENT: CPT

## 2021-04-20 PROCEDURE — 58661 LAPAROSCOPY REMOVE ADNEXA: CPT

## 2021-04-20 PROCEDURE — 86850 RBC ANTIBODY SCREEN: CPT

## 2021-04-20 PROCEDURE — 64999 UNLISTED PX NERVOUS SYSTEM: CPT

## 2021-04-20 PROCEDURE — 76942 ECHO GUIDE FOR BIOPSY: CPT

## 2021-04-20 PROCEDURE — 86900 BLOOD TYPING SEROLOGIC ABO: CPT

## 2021-04-20 RX ADMIN — KETOROLAC TROMETHAMINE SCH: 15 INJECTION, SOLUTION INTRAMUSCULAR; INTRAVENOUS at 16:14

## 2021-04-20 RX ADMIN — HYDROMORPHONE HYDROCHLORIDE PRN MG: 1 INJECTION, SOLUTION INTRAMUSCULAR; INTRAVENOUS; SUBCUTANEOUS at 08:50

## 2021-04-20 RX ADMIN — HYDROMORPHONE HYDROCHLORIDE PRN MG: 1 INJECTION, SOLUTION INTRAMUSCULAR; INTRAVENOUS; SUBCUTANEOUS at 08:55

## 2021-04-20 RX ADMIN — KETOROLAC TROMETHAMINE SCH MG: 15 INJECTION, SOLUTION INTRAMUSCULAR; INTRAVENOUS at 14:21

## 2021-04-20 NOTE — P.ANPRN
Procedure Note - Anesthesia





- Nerve Block Performed


  ** Bilateral Erector Spinae Single


Time Out Performed: Yes


Date of Procedure: 04/20/21


Location of Patient: PreOp


Indication: Acute Post-Operative Pain, Requested by Surgeon


Sedation Type: Sedate with meaningful contact maintained


Preparation: Sterile Prep


Position: Prone


Needle Types: Pajunk


Needle Gauge: 21


Ultrasound used to visualize needle placement: Yes


Ultrasound used to observe medication spread: Yes


Blood Aspirated: No


Pain Paresthesia on Injection Noted: No


Resistance on Injection: Normal


Image Stored and Saved: Yes


Events: Uneventful and Well Tolerated (ropi .5% 15cc plus xylo 1% 15cc 

bilareally at t10)

## 2021-04-20 NOTE — P.OP
Date of Procedure: 21


Preoperative Diagnosis: 


Breast cancer estrogen receptor positive


Postoperative Diagnosis: 


Same


Procedure(s) Performed: 


Mini laparotomy, bilateral salpingo-oophorectomy


Anesthesia: WENDYA


Surgeon: Willow Baker


Assistant #1: Shanika Thomas


Estimated Blood Loss (ml): 20


IV fluids (ml): 600


Urine output (ml): 50


Pathology: other (Bilateral tubes and ovaries are separate cover)


Condition: stable


Disposition: PACU


Description of Procedure: 


Patient is brought to the operating suite where a general anesthetic is 

administered without difficulty.  The abdomen is prepped and draped in usual 

sterile fashion.  Yi catheter placed to direct drainage.  Antibiotics are 

given.  Appropriate timeout is performed to assure proper patient and procedural

identification.  A midline low transverse minilaparotomy incision is made 

through a previous  section scar.  This was taken down through the 

subcutaneous tissue which is approximately 6 cm deep.  The fascia is isolated, 

scored, extended bilaterally with curved Cruz scissors.  Peritoneum is next 

identified and incised, there is no bowel or bladder involvement.  Gentle manual

exploration is performed in the right tube and ovary are grasped and brought 

into the surgical field and held with a Hopkins clamp.  Shahriar clamp was used 

across the tube and ovary and it is removed and sent to pathology.  0 Vicryl 

suture is used to tie the pedicle, flashed, and retied for excellent hemostasis.

 The remaining base is cauterized and the pedicle is placed back into the 

abdominal cavity.





Manual exploration allows me to identify the left tube and ovary which is again 

brought into the operative field with a Hopkins clamp.  Shahriar clamp is used 

across the base of the tube and ovary, it is removed and sent to pathology.  0 

Vicryl suture is used to secure the pedicle, it is tied, flashed, and retied.  

Electrocautery is used on the remaining stump.  It is allowed to cool and placed

back into the abdominal cavity.  Hemostasis is excellent.  Peritoneum is allowed

to close by secondary intention.  Fascia is closed in a running stitch of 0 

Vicryl.  Subcutaneous tissue is clean and dry, reapproximated with 3-0 Vicryl in

a running fashion.  4-0 undyed Vicryl issues for final skin closure, Steri-

Strips and Mastisol applied to the wound.





All sponge needle and instrument counts are correct.  Yi is draining clear 

urine.  Patient is brought back to the recovery room in very good condition with

a blood pressure of 121/67, pulse 66, 99% O2 saturation.

## 2021-04-21 VITALS — DIASTOLIC BLOOD PRESSURE: 83 MMHG | SYSTOLIC BLOOD PRESSURE: 124 MMHG | HEART RATE: 88 BPM | TEMPERATURE: 98.3 F

## 2021-04-21 NOTE — P.DS
Providers


Date of admission: 





4/20/21


Expected date of discharge: 04/21/21


Attending physician: 


Willow Baker





Primary care physician: 


[facility] Course: 





This is a 46-year-old female who presented for bilateral salpingo-oophorectomy 

for history of positive estrogen receptor breast cancer.  Oncologist recommended

removal of the ovaries.  All risks and benefits discussed in detail.  Please see

my history and physical for details.





Yesterday she was admitted and underwent a mini laparotomy with removal of 

bilateral tubes and ovaries.  She did well intraoperatively.  Ovaries appeared 

normal to gross inspection.  Uterus is unremarkable.  Please see dictated 

operative note for details.





This morning the patient feels well.  She is voiding, ambulating passing flatus 

without difficulty.  She did receive 1 albuterol treatment for scattered 

rhonchi, lungs are clear to auscultation this morning.  Pulse ox on room air 

93%, improved with deep breathing and cough.  Patient has a bedside Tri-flow 

which she is using effectively.  Incision is clean and dry, intact, active bowel

sounds, no CVA tenderness.  Extremities negative.  Patient is judged to be in 

good condition for discharge home.





She will follow-up with me in the office in 2 weeks.  She is reminded no 

intercourse, tampons or douching.  She will use over-the-counter Advil or Aleve,

or Motrin as needed for pain.  She will continue to use the Tri-flow every 1-2 

hours at home and use her antihistamines as preop.  She will call with any 

fevers shakes or chills, pain not alleviated by over-the-counter products, 

shortness of breath or any respiratory difficulties, or indeed with any 

concerns.


Assessment: 


Doing well postoperative day #1


Patient Condition at Discharge: Good





Plan - Discharge Summary


New Discharge Prescriptions: 


No Action


   Multivitamins, Thera [Multivitamin (formulary)] 1 tab PO DAILY


   Letrozole 2.5 mg PO DAILY


Discharge Medication List





Multivitamins, Thera [Multivitamin (formulary)] 1 tab PO DAILY 12/15/17 

[History]


Letrozole 2.5 mg PO DAILY 07/01/19 [History]








Follow up Appointment(s)/Referral(s): 


Willow Baker MD [STAFF PHYSICIAN] - 2 Weeks


Discharge Disposition: HOME SELF-CARE

## 2021-06-16 ENCOUNTER — HOSPITAL ENCOUNTER (OUTPATIENT)
Dept: HOSPITAL 47 - RADMAMWWP | Age: 46
Discharge: HOME | End: 2021-06-16
Attending: INTERNAL MEDICINE
Payer: COMMERCIAL

## 2021-06-16 DIAGNOSIS — Z90.12: ICD-10-CM

## 2021-06-16 DIAGNOSIS — Z85.3: ICD-10-CM

## 2021-06-16 DIAGNOSIS — Z78.0: ICD-10-CM

## 2021-06-16 DIAGNOSIS — Z80.3: ICD-10-CM

## 2021-06-16 DIAGNOSIS — N64.89: Primary | ICD-10-CM

## 2021-06-16 PROCEDURE — 77065 DX MAMMO INCL CAD UNI: CPT

## 2021-06-16 PROCEDURE — 77061 BREAST TOMOSYNTHESIS UNI: CPT

## 2021-06-16 NOTE — USB
EXAMINATION TYPE: US breast limited RT

 

DATE OF EXAM: 6/16/2021

 

COMPARISON: Mammogram same date

 

CLINICAL HISTORY: R92.8 Abnormal Mammo,Z85.3 Personal Hx Breast CA. Right breast asymmetry and right 
axillary fullness.

 

FINDINGS:

Targeted right breast ultrasound was performed at 9-12:00, the retroareolar region and in the axilla.


 

Lymph nodes are noted in the axillary tail without definite cortical thickening.

 

There are 2 adjacent cysts in the right breast measuring up to 0.9 and 0.6 cm which corresponds well 
in size, location and morphology to the central breast asymmetry on mammogram.

 

IMPRESSION:

No sonographic evidence for malignancy.

 

Right diagnostic mammogram is recommended in 12 months. Patient is status post left mastectomy.

 

BI-RADS 2, benign.

## 2021-06-17 NOTE — MM
Reason for exam: follow-up at short interval from prior study.

Last mammogram was performed 6 months ago.



History:

Patient is postmenopausal and has history of breast cancer at age 37.

Family history of breast cancer in maternal aunt at age 50.

Benign US biopsy breast VAD RT of the right breast, November 28, 2014.  
Mastectomy

of the left breast, June 5, 2012.  Malignant left mammotome panel of the left 

breast, May 9, 2012.  Implant in the left breast, 2012.  Breast lift of the 
right 

breast, 2012.  Reconstruction of the left breast, 2012.

Took hormonal contraceptives for 2 years 6 months.  Taking tamoxifen for 7 years


beginning at age 37.  Took antineoplastic for 8 years 6 months.



Physical Findings:

Nurse did not find any significant physical abnormalities on exam.



MG 3D Diag Mammo W/Cad RT

CC and MLO view(s) were taken of the right breast.

Prior study comparison: December 3, 2020, right breast MG 3d work up w/cad RT.  

November 16, 2020, bilateral MG 3d scr gabriela unilateral w/cad.

There is an unchanged mass right central breast middle depth and ultrasound is 

recommended. Right axillary ultrasound is recommended for fullness and 

lymphadenopathy. Right lymph node with cortical thickening and biopsy clip not 

changed.



These results were verbally communicated with the patient and result sheet given


to the patient on 6/16/21.





ASSESSMENT: Incomplete: need additional imaging evaluation, BI-RAD 0



RECOMMENDATION:

Ultrasound of the right breast.

MTDD

## 2022-07-06 ENCOUNTER — HOSPITAL ENCOUNTER (OUTPATIENT)
Dept: HOSPITAL 47 - RADMAMWWP | Age: 47
Discharge: HOME | End: 2022-07-06
Attending: INTERNAL MEDICINE
Payer: COMMERCIAL

## 2022-07-06 DIAGNOSIS — R92.8: Primary | ICD-10-CM

## 2022-07-06 DIAGNOSIS — Z80.3: ICD-10-CM

## 2022-07-06 DIAGNOSIS — Z78.0: ICD-10-CM

## 2022-07-06 DIAGNOSIS — Z85.3: ICD-10-CM

## 2022-07-06 PROCEDURE — 77065 DX MAMMO INCL CAD UNI: CPT

## 2022-07-06 PROCEDURE — 77061 BREAST TOMOSYNTHESIS UNI: CPT

## 2022-07-06 NOTE — MM
Reason for Exam: Hx of breast cancer, mastectomy. 

Last mammogram was performed 1 year(s) and 1 month(s) ago. 





Patient History: 

Menarche at age 14. First Full-Term Pregnancy at age 16. Left ovary removed at age 46. Right ovary

removed at age 46. Postmenopausal. Breast cancer, left, age 37. Hormonal Contraceptives for 2 years,

6 months. Currently using Tamoxifen, beginning at age 37 for 7 years. 06/05/2012, Mastectomy on the

Left side. 11/28/2014, Benign Core Biopsy on the right side. 5/9/2012, Malignant Core Biopsy on the

left side. 2012, Implant on the left side. 2012, Implant on the left side.

Maternal aunt had breast cancer, age 50. 





Prior Study Comparison: 

10/28/2013 Right Diagnostic Ultrasound, [facility]. 10/29/2014 Right Diagnostic Mammogram, [facility]. 10/29/2014

Right Diagnostic Ultrasound, [facility]. 5/29/2015 Right Diagnostic Ultrasound, [facility]. 10/30/2015 Right

Diagnostic Mammogram, [facility]. 1/29/2016 Left Diagnostic Ultrasound, [facility]. 2/4/2016 Bilateral Diagnostic

Breast MRI, [facility]. 11/2/2016 Right Diagnostic Mammogram, [facility]. 11/6/2017 Right Diagnostic Mammogram,

[facility]. 11/12/2018 Right Diagnostic Mammogram, [facility]. 11/13/2019 Right Diagnostic Mammogram, [facility].

11/13/2019 Right Diagnostic Ultrasound, [facility]. 11/16/2020 Bilateral Screening Mammogram, [facility].

12/3/2020 Right Diagnostic Mammogram, [facility]. 12/3/2020 Right Diagnostic Ultrasound, [facility]. 1/28/2021

Right Diagnostic Ultrasound, [facility]. 3/29/2021 Right Diagnostic Ultrasound, [facility]. 6/16/2021 Right

Diagnostic Mammogram, [facility]. 





Tissue Density: 

Right: There are scattered fibroglandular densities.





Findings: 

Analyzed By CAD. 

Focal asymmetric density in the central middle depth right breast on MLO views is unchanged from

prior studies. Prominent right axillary lymph nodes with biopsy clip redemonstrated and stable. No

suspicious new mass or distortion in the right breast. 





Overall Assessment: Benign, BI-RAD 2





Management: 

Screening Mammogram of the right breast in 1 year.

A clinical breast exam by your physician is recommended on an annual basis and results should be

correlated with mammographic findings.  This exam should not preclude additional follow-up of

suspicious palpable abnormalities.  Results were given to the patient verbally at the time of exam.



Electronically signed and approved by: Yasmany Francisco M.D.

## 2022-12-19 ENCOUNTER — HOSPITAL ENCOUNTER (OUTPATIENT)
Dept: HOSPITAL 47 - EC | Age: 47
Setting detail: OBSERVATION
LOS: 1 days | Discharge: HOME | End: 2022-12-20
Attending: INTERNAL MEDICINE | Admitting: INTERNAL MEDICINE
Payer: COMMERCIAL

## 2022-12-19 DIAGNOSIS — Z90.12: ICD-10-CM

## 2022-12-19 DIAGNOSIS — Z88.5: ICD-10-CM

## 2022-12-19 DIAGNOSIS — Z20.822: ICD-10-CM

## 2022-12-19 DIAGNOSIS — J06.9: Primary | ICD-10-CM

## 2022-12-19 DIAGNOSIS — Z85.3: ICD-10-CM

## 2022-12-19 DIAGNOSIS — J45.909: ICD-10-CM

## 2022-12-19 DIAGNOSIS — Z79.811: ICD-10-CM

## 2022-12-19 DIAGNOSIS — J21.0: ICD-10-CM

## 2022-12-19 LAB
ALBUMIN SERPL-MCNC: 4.1 G/DL (ref 3.5–5)
ALP SERPL-CCNC: 82 U/L (ref 38–126)
ALT SERPL-CCNC: 27 U/L (ref 4–34)
ANION GAP SERPL CALC-SCNC: 3 MMOL/L
APTT BLD: 23.8 SEC (ref 22–30)
AST SERPL-CCNC: 25 U/L (ref 14–36)
BASOPHILS # BLD AUTO: 0.1 K/UL (ref 0–0.2)
BASOPHILS NFR BLD AUTO: 1 %
BUN SERPL-SCNC: 17 MG/DL (ref 7–17)
CALCIUM SPEC-MCNC: 8.8 MG/DL (ref 8.4–10.2)
CHLORIDE SERPL-SCNC: 107 MMOL/L (ref 98–107)
CO2 SERPL-SCNC: 28 MMOL/L (ref 22–30)
EOSINOPHIL # BLD AUTO: 0.1 K/UL (ref 0–0.7)
EOSINOPHIL NFR BLD AUTO: 2 %
ERYTHROCYTE [DISTWIDTH] IN BLOOD BY AUTOMATED COUNT: 4.73 M/UL (ref 3.8–5.4)
ERYTHROCYTE [DISTWIDTH] IN BLOOD: 12 % (ref 11.5–15.5)
GLUCOSE SERPL-MCNC: 94 MG/DL (ref 74–99)
HCT VFR BLD AUTO: 42.6 % (ref 34–46)
HGB BLD-MCNC: 14.4 GM/DL (ref 11.4–16)
INR PPP: 0.9 (ref ?–1.2)
LYMPHOCYTES # SPEC AUTO: 1.5 K/UL (ref 1–4.8)
LYMPHOCYTES NFR SPEC AUTO: 19 %
MAGNESIUM SPEC-SCNC: 2.1 MG/DL (ref 1.6–2.3)
MCH RBC QN AUTO: 30.5 PG (ref 25–35)
MCHC RBC AUTO-ENTMCNC: 33.9 G/DL (ref 31–37)
MCV RBC AUTO: 90 FL (ref 80–100)
MONOCYTES # BLD AUTO: 0.8 K/UL (ref 0–1)
MONOCYTES NFR BLD AUTO: 10 %
NEUTROPHILS # BLD AUTO: 5.5 K/UL (ref 1.3–7.7)
NEUTROPHILS NFR BLD AUTO: 67 %
PLATELET # BLD AUTO: 305 K/UL (ref 150–450)
POTASSIUM SERPL-SCNC: 4.4 MMOL/L (ref 3.5–5.1)
PROT SERPL-MCNC: 7.1 G/DL (ref 6.3–8.2)
PT BLD: 10 SEC (ref 9–12)
SODIUM SERPL-SCNC: 138 MMOL/L (ref 137–145)
WBC # BLD AUTO: 8.3 K/UL (ref 3.8–10.6)

## 2022-12-19 PROCEDURE — 83735 ASSAY OF MAGNESIUM: CPT

## 2022-12-19 PROCEDURE — 93005 ELECTROCARDIOGRAM TRACING: CPT

## 2022-12-19 PROCEDURE — 36415 COLL VENOUS BLD VENIPUNCTURE: CPT

## 2022-12-19 PROCEDURE — 96361 HYDRATE IV INFUSION ADD-ON: CPT

## 2022-12-19 PROCEDURE — 94640 AIRWAY INHALATION TREATMENT: CPT

## 2022-12-19 PROCEDURE — 84484 ASSAY OF TROPONIN QUANT: CPT

## 2022-12-19 PROCEDURE — 71046 X-RAY EXAM CHEST 2 VIEWS: CPT

## 2022-12-19 PROCEDURE — 99285 EMERGENCY DEPT VISIT HI MDM: CPT

## 2022-12-19 PROCEDURE — 96365 THER/PROPH/DIAG IV INF INIT: CPT

## 2022-12-19 PROCEDURE — 85730 THROMBOPLASTIN TIME PARTIAL: CPT

## 2022-12-19 PROCEDURE — 85025 COMPLETE CBC W/AUTO DIFF WBC: CPT

## 2022-12-19 PROCEDURE — 80053 COMPREHEN METABOLIC PANEL: CPT

## 2022-12-19 PROCEDURE — 87636 SARSCOV2 & INF A&B AMP PRB: CPT

## 2022-12-19 PROCEDURE — 96375 TX/PRO/DX INJ NEW DRUG ADDON: CPT

## 2022-12-19 PROCEDURE — 83880 ASSAY OF NATRIURETIC PEPTIDE: CPT

## 2022-12-19 PROCEDURE — 96376 TX/PRO/DX INJ SAME DRUG ADON: CPT

## 2022-12-19 PROCEDURE — 96366 THER/PROPH/DIAG IV INF ADDON: CPT

## 2022-12-19 PROCEDURE — 85610 PROTHROMBIN TIME: CPT

## 2022-12-19 PROCEDURE — 84100 ASSAY OF PHOSPHORUS: CPT

## 2022-12-19 RX ADMIN — IPRATROPIUM BROMIDE AND ALBUTEROL SULFATE SCH ML: .5; 3 SOLUTION RESPIRATORY (INHALATION) at 19:51

## 2022-12-19 RX ADMIN — CEFAZOLIN SCH MLS/HR: 330 INJECTION, POWDER, FOR SOLUTION INTRAMUSCULAR; INTRAVENOUS at 07:48

## 2022-12-19 RX ADMIN — IPRATROPIUM BROMIDE AND ALBUTEROL SULFATE SCH ML: .5; 3 SOLUTION RESPIRATORY (INHALATION) at 07:24

## 2022-12-19 RX ADMIN — IPRATROPIUM BROMIDE AND ALBUTEROL SULFATE SCH ML: .5; 3 SOLUTION RESPIRATORY (INHALATION) at 11:36

## 2022-12-19 RX ADMIN — ACETAMINOPHEN PRN MG: 500 TABLET ORAL at 12:23

## 2022-12-19 RX ADMIN — METHYLPREDNISOLONE SODIUM SUCCINATE SCH MG: 40 INJECTION, POWDER, FOR SOLUTION INTRAMUSCULAR; INTRAVENOUS at 17:34

## 2022-12-19 RX ADMIN — IPRATROPIUM BROMIDE AND ALBUTEROL SULFATE SCH ML: .5; 3 SOLUTION RESPIRATORY (INHALATION) at 15:46

## 2022-12-19 NOTE — ED
SOB HPI





- General


Chief Complaint: Upper Respiratory Infection


Stated Complaint: Fever, Congestion, Light headed


Time Seen by Provider: 22 04:46


Source: patient, RN notes reviewed


Mode of arrival: ambulatory


Limitations: no limitations





- History of Present Illness


MD Complaint: shortness of breath, cough, chest pain, anxiety


Radiation: back


Severity: moderate


Severity scale (1-10): 4


Known History Of: asthma


Context: recent URI


Associated Symptoms: chest pain, cough


Treatments Prior to Arrival: none





- Related Data


                                Home Medications











 Medication  Instructions  Recorded  Confirmed


 


Multivitamins, Thera [Multivitamin 1 tab PO DAILY 12/15/17 04/14/21





(formulary)]   


 


RX: Letrozole 2.5 mg PO DAILY 19











                                    Allergies











Allergy/AdvReac Type Severity Reaction Status Date / Time


 


hydrocodone [From Vicodin] AdvReac  Itching Verified 22 04:08














Review of Systems


ROS Statement: 


Those systems with pertinent positive or pertinent negative responses have been 

documented in the HPI.





ROS Other: All systems not noted in ROS Statement are negative.





Past Medical History


Past Medical History: Cancer


Additional Past Medical History / Comment(s): Breast CA


History of Any Multi-Drug Resistant Organisms: None Reported


Past Surgical History: Breast Surgery,  Section, Tubal Ligation


Additional Past Surgical History / Comment(s): LEFT MASTECTOMY


Past Anesthesia/Blood Transfusion Reactions: No Reported Reaction


Past Psychological History: No Psychological Hx Reported


Smoking Status: Never smoker


Past Alcohol Use History: None Reported


Past Drug Use History: None Reported





General Exam


Limitations: no limitations


General appearance: alert, in no apparent distress, anxious


Head exam: Present: atraumatic, normocephalic, normal inspection


Eye exam: Present: normal appearance, PERRL, EOMI.  Absent: scleral icterus, 

conjunctival injection, periorbital swelling


ENT exam: Present: normal exam, mucous membranes moist


Neck exam: Present: normal inspection.  Absent: tenderness, meningismus, 

lymphadenopathy


Respiratory exam: Present: normal lung sounds bilaterally, wheezes.  Absent: 

respiratory distress, rales, rhonchi, stridor


Cardiovascular Exam: Present: normal rhythm, tachycardia, normal heart sounds.  

Absent: systolic murmur, diastolic murmur, rubs, gallop, clicks


GI/Abdominal exam: Present: soft, normal bowel sounds.  Absent: distended, 

tenderness, guarding, rebound, rigid


Extremities exam: Present: normal inspection, full ROM, normal capillary refill.

 Absent: tenderness, pedal edema, joint swelling, calf tenderness


Back exam: Present: normal inspection


Neurological exam: Present: alert, oriented X3, CN II-XII intact


Psychiatric exam: Present: normal affect, normal mood


Skin exam: Present: warm, dry, intact, normal color.  Absent: rash





Course


                                   Vital Signs











  22





  04:05 06:00 06:09


 


Temperature 100.4 F H  


 


Pulse Rate 115 H 95 106 H


 


Respiratory 20  





Rate   


 


Blood Pressure 158/86  


 


O2 Sat by Pulse 97  





Oximetry   














Medical Decision Making





- Lab Data


Result diagrams: 


                                 22 05:30





                                 22 05:30


                                   Lab Results











  22 Range/Units





  04:25 05:30 05:30 


 


WBC   8.3   (3.8-10.6)  k/uL


 


RBC   4.73   (3.80-5.40)  m/uL


 


Hgb   14.4   (11.4-16.0)  gm/dL


 


Hct   42.6   (34.0-46.0)  %


 


MCV   90.0   (80.0-100.0)  fL


 


MCH   30.5   (25.0-35.0)  pg


 


MCHC   33.9   (31.0-37.0)  g/dL


 


RDW   12.0   (11.5-15.5)  %


 


Plt Count   305   (150-450)  k/uL


 


MPV   7.6   


 


Neutrophils %   67   %


 


Lymphocytes %   19   %


 


Monocytes %   10   %


 


Eosinophils %   2   %


 


Basophils %   1   %


 


Neutrophils #   5.5   (1.3-7.7)  k/uL


 


Lymphocytes #   1.5   (1.0-4.8)  k/uL


 


Monocytes #   0.8   (0-1.0)  k/uL


 


Eosinophils #   0.1   (0-0.7)  k/uL


 


Basophils #   0.1   (0-0.2)  k/uL


 


PT    10.0  (9.0-12.0)  sec


 


INR    0.9  (<1.2)  


 


APTT    23.8  (22.0-30.0)  sec


 


Sodium     (137-145)  mmol/L


 


Potassium     (3.5-5.1)  mmol/L


 


Chloride     ()  mmol/L


 


Carbon Dioxide     (22-30)  mmol/L


 


Anion Gap     mmol/L


 


BUN     (7-17)  mg/dL


 


Creatinine     (0.52-1.04)  mg/dL


 


Est GFR (CKD-EPI)AfAm     (>60 ml/min/1.73 sqM)  


 


Est GFR (CKD-EPI)NonAf     (>60 ml/min/1.73 sqM)  


 


Glucose     (74-99)  mg/dL


 


Calcium     (8.4-10.2)  mg/dL


 


Phosphorus     (2.5-4.5)  mg/dL


 


Magnesium     (1.6-2.3)  mg/dL


 


Total Bilirubin     (0.2-1.3)  mg/dL


 


AST     (14-36)  U/L


 


ALT     (4-34)  U/L


 


Alkaline Phosphatase     ()  U/L


 


Troponin I     (0.000-0.034)  ng/mL


 


NT-Pro-B Natriuret Pep     pg/mL


 


Total Protein     (6.3-8.2)  g/dL


 


Albumin     (3.5-5.0)  g/dL


 


Influenza Type A (PCR)  Not Detected    (Not Detectd)  


 


Influenza Type B (PCR)  Not Detected    (Not Detectd)  


 


RSV (PCR)  Detected A    (Not Detectd)  


 


SARS-CoV-2 (PCR)  Not Detected    (Not Detectd)  














  22 Range/Units





  05:30 05:30 05:30 


 


WBC     (3.8-10.6)  k/uL


 


RBC     (3.80-5.40)  m/uL


 


Hgb     (11.4-16.0)  gm/dL


 


Hct     (34.0-46.0)  %


 


MCV     (80.0-100.0)  fL


 


MCH     (25.0-35.0)  pg


 


MCHC     (31.0-37.0)  g/dL


 


RDW     (11.5-15.5)  %


 


Plt Count     (150-450)  k/uL


 


MPV     


 


Neutrophils %     %


 


Lymphocytes %     %


 


Monocytes %     %


 


Eosinophils %     %


 


Basophils %     %


 


Neutrophils #     (1.3-7.7)  k/uL


 


Lymphocytes #     (1.0-4.8)  k/uL


 


Monocytes #     (0-1.0)  k/uL


 


Eosinophils #     (0-0.7)  k/uL


 


Basophils #     (0-0.2)  k/uL


 


PT     (9.0-12.0)  sec


 


INR     (<1.2)  


 


APTT     (22.0-30.0)  sec


 


Sodium  138    (137-145)  mmol/L


 


Potassium  4.4    (3.5-5.1)  mmol/L


 


Chloride  107    ()  mmol/L


 


Carbon Dioxide  28    (22-30)  mmol/L


 


Anion Gap  3    mmol/L


 


BUN  17    (7-17)  mg/dL


 


Creatinine  0.84    (0.52-1.04)  mg/dL


 


Est GFR (CKD-EPI)AfAm  >90    (>60 ml/min/1.73 sqM)  


 


Est GFR (CKD-EPI)NonAf  82    (>60 ml/min/1.73 sqM)  


 


Glucose  94    (74-99)  mg/dL


 


Calcium  8.8    (8.4-10.2)  mg/dL


 


Phosphorus  2.8    (2.5-4.5)  mg/dL


 


Magnesium  2.1    (1.6-2.3)  mg/dL


 


Total Bilirubin  0.4    (0.2-1.3)  mg/dL


 


AST  25    (14-36)  U/L


 


ALT  27    (4-34)  U/L


 


Alkaline Phosphatase  82    ()  U/L


 


Troponin I   <0.012   (0.000-0.034)  ng/mL


 


NT-Pro-B Natriuret Pep    106  pg/mL


 


Total Protein  7.1    (6.3-8.2)  g/dL


 


Albumin  4.1    (3.5-5.0)  g/dL


 


Influenza Type A (PCR)     (Not Detectd)  


 


Influenza Type B (PCR)     (Not Detectd)  


 


RSV (PCR)     (Not Detectd)  


 


SARS-CoV-2 (PCR)     (Not Detectd)  














Disposition


Clinical Impression: 


 Acute upper respiratory infection, Bronchitis, RSV (acute bronchiolitis due to 

respiratory syncytial virus), Fever





Disposition: ADMITTED AS IP TO THIS HOSP


Condition: Fair


Is patient prescribed a controlled substance at d/c from ED?: No


Referrals: 


Farideh Shepard MD [Primary Care Provider] - 1-2 days


Time of Disposition: 06:55

## 2022-12-19 NOTE — P.HPIM
History of Present Illness


H&P Date: 22








Filomena Epperson, is a 47 year old female who presented to [facility] emergency room with a chief complaint of cough, shortness of breath and

wheezing


She was evaluated in the emergency room vital examination on presentation 

revealed a temperature of 100.4 pulse 115 respiration 20 blood pressure 158/86 

pulse ox 97% on room air


Laboratory data revealed laboratory data revealed a white blood count of 8.3 

hemoglobin 14.4 platelet count 305 RSV PCR was positive


Testing in the emergency room revealed chest x-ray done in the emergency room 

revealed no active cardiopulmonary disease no change, EKG revealed sinus rhythm 

no acute ischemic changes


Patient was admitted to medical floor for further evaluation and treatment, she 

was started on inhaled bronchodilators and on IV Solu-Medrol


Past medical history patient denies any significant past medical history she 

denies any history of asthma or COPD she never smoked she denies any history of 

cardiac disease no history of angina or coronary artery disease or congestive 

heart











Past Medical History


Past Medical History: Cancer


Additional Past Medical History / Comment(s): Breast CA


History of Any Multi-Drug Resistant Organisms: None Reported


Past Surgical History: Breast Surgery,  Section, Tubal Ligation


Additional Past Surgical History / Comment(s): LEFT MASTECTOMY


Past Anesthesia/Blood Transfusion Reactions: No Reported Reaction


Past Psychological History: No Psychological Hx Reported


Smoking Status: Never smoker


Past Alcohol Use History: None Reported


Past Drug Use History: None Reported





Medications and Allergies


                                Home Medications











 Medication  Instructions  Recorded  Confirmed  Type


 


Semaglutide [Ozempic] 0.5 mg SQ TU 22 History








                                    Allergies











Allergy/AdvReac Type Severity Reaction Status Date / Time


 


hydrocodone [From Vicodin] AdvReac  Itching Verified 22 07:21














Physical Exam


Vitals: 


                                   Vital Signs











  Temp Pulse Pulse Resp BP BP Pulse Ox


 


 22 11:48   90     


 


 22 11:37   88     


 


 22 08:46  98.4 F   103 H  16   109/66  96


 


 22 07:34   90     


 


 22 07:24   89     


 


 22 07:00   90   16  117/73   100


 


 22 06:09   106 H     


 


 22 06:00   95     


 


 22 04:05  100.4 F H  115 H   20  158/86   97








                                Intake and Output











 12/22





 22:59 06:59 14:59


 


Other:   


 


  Weight  83.915 kg 83.915 kg

















In general patient is alert and oriented x 3 in no distress


HEENT head normocephalic and atraumatic


Neck is supple no JVD no goiter no lymphadenopathy no carotid bruit


Chest examination reveals a scattered crackles bilaterally, with severe wheezing


Cardiac exam reveals regular heart sounds S1 and S2 no gallops no murmurs


Abdomen is soft nontender no organomegaly with normal bowel sounds


Extremity exam reveals no edema no cyanosis or clubbing


Neurological examination reveals no gross focal deficits





Results


CBC & Chem 7: 


                                 22 05:30





                                 22 05:30


Labs: 


                  Abnormal Lab Results - Last 24 Hours (Table)











  22 Range/Units





  04:25 


 


RSV (PCR)  Detected A  (Not Detectd)  














Thrombosis Risk Factor Assmnt





- Choose All That Apply


Each Factor Represents 1 point: Age 41-60 years


Thrombosis Risk Factor Assessment Total Risk Factor Score: 1


Thrombosis Risk Factor Assessment Level: Low Risk





Assessment and Plan


Plan: 








Acute bronchitis with RSV, symptomatic, admitted to observation for symptom 

relief


Continue IV Solu-Medrol and inhaled albuterol and Atrovent


Will monitor for 24 hours if improving plan to discharge to home tomorrow

## 2022-12-19 NOTE — XR
EXAMINATION TYPE: XR chest 2V

 

DATE OF EXAM: 12/19/2022

 

COMPARISON: 1/29/2016

 

HISTORY: Cough

 

TECHNIQUE: 2 view

 

FINDINGS: Heart and mediastinum are normal. Lungs are clear. Diaphragm is normal. Bony thorax is inta
ct.

 

IMPRESSION: No active cardiopulmonary disease. No change.

## 2022-12-20 VITALS — TEMPERATURE: 98 F | SYSTOLIC BLOOD PRESSURE: 131 MMHG | RESPIRATION RATE: 16 BRPM | DIASTOLIC BLOOD PRESSURE: 76 MMHG

## 2022-12-20 VITALS — HEART RATE: 88 BPM

## 2022-12-20 LAB
ALBUMIN SERPL-MCNC: 4.3 G/DL (ref 3.8–4.9)
ALBUMIN/GLOB SERPL: 1.54 G/DL (ref 1.6–3.17)
ALP SERPL-CCNC: 82 U/L (ref 41–126)
ALT SERPL-CCNC: 46 U/L (ref 8–44)
ANION GAP SERPL CALC-SCNC: 13.3 MMOL/L (ref 10–18)
AST SERPL-CCNC: 30 U/L (ref 13–35)
BASOPHILS # BLD AUTO: 0.02 X 10*3/UL (ref 0–0.1)
BASOPHILS NFR BLD AUTO: 0.3 %
BUN SERPL-SCNC: 13.3 MG/DL (ref 9–27)
BUN/CREAT SERPL: 19 RATIO (ref 12–20)
CALCIUM SPEC-MCNC: 9.4 MG/DL (ref 8.7–10.3)
CHLORIDE SERPL-SCNC: 104 MMOL/L (ref 96–109)
CO2 SERPL-SCNC: 17.7 MMOL/L (ref 20–27.5)
EOSINOPHIL # BLD AUTO: 0 X 10*3/UL (ref 0.04–0.35)
EOSINOPHIL NFR BLD AUTO: 0 %
ERYTHROCYTE [DISTWIDTH] IN BLOOD BY AUTOMATED COUNT: 4.66 X 10*6/UL (ref 4.1–5.2)
ERYTHROCYTE [DISTWIDTH] IN BLOOD: 12.2 % (ref 11.5–14.5)
GLOBULIN SER CALC-MCNC: 2.8 G/DL (ref 1.6–3.3)
GLUCOSE SERPL-MCNC: 183 MG/DL (ref 70–110)
HCT VFR BLD AUTO: 43.4 % (ref 37.2–46.3)
HGB BLD-MCNC: 13.9 G/DL (ref 12–15)
IMM GRANULOCYTES BLD QL AUTO: 0.7 %
LYMPHOCYTES # SPEC AUTO: 1.53 X 10*3/UL (ref 0.9–5)
LYMPHOCYTES NFR SPEC AUTO: 26.2 %
MCH RBC QN AUTO: 29.8 PG (ref 27–32)
MCHC RBC AUTO-ENTMCNC: 32 G/DL (ref 32–37)
MCV RBC AUTO: 93.1 FL (ref 80–97)
MONOCYTES # BLD AUTO: 0.11 X 10*3/UL (ref 0.2–1)
MONOCYTES NFR BLD AUTO: 1.9 %
NEUTROPHILS # BLD AUTO: 4.13 X 10*3/UL (ref 1.8–7.7)
NEUTROPHILS NFR BLD AUTO: 70.9 %
NRBC BLD AUTO-RTO: 0 /100 WBCS (ref 0–0)
PLATELET # BLD AUTO: 309 X 10*3/UL (ref 140–440)
POTASSIUM SERPL-SCNC: 4.3 MMOL/L (ref 3.5–5.5)
PROT SERPL-MCNC: 7.1 G/DL (ref 6.2–8.2)
SODIUM SERPL-SCNC: 135 MMOL/L (ref 135–145)
WBC # BLD AUTO: 5.83 X 10*3/UL (ref 4.5–10)

## 2022-12-20 RX ADMIN — CEFAZOLIN SCH: 330 INJECTION, POWDER, FOR SOLUTION INTRAMUSCULAR; INTRAVENOUS at 05:40

## 2022-12-20 RX ADMIN — IPRATROPIUM BROMIDE AND ALBUTEROL SULFATE SCH ML: .5; 3 SOLUTION RESPIRATORY (INHALATION) at 04:54

## 2022-12-20 RX ADMIN — IPRATROPIUM BROMIDE AND ALBUTEROL SULFATE SCH ML: .5; 3 SOLUTION RESPIRATORY (INHALATION) at 11:46

## 2022-12-20 RX ADMIN — IPRATROPIUM BROMIDE AND ALBUTEROL SULFATE SCH: .5; 3 SOLUTION RESPIRATORY (INHALATION) at 00:04

## 2022-12-20 RX ADMIN — IPRATROPIUM BROMIDE AND ALBUTEROL SULFATE SCH ML: .5; 3 SOLUTION RESPIRATORY (INHALATION) at 07:26

## 2022-12-20 RX ADMIN — METHYLPREDNISOLONE SODIUM SUCCINATE SCH MG: 40 INJECTION, POWDER, FOR SOLUTION INTRAMUSCULAR; INTRAVENOUS at 00:26

## 2022-12-20 RX ADMIN — ACETAMINOPHEN PRN MG: 500 TABLET ORAL at 09:51

## 2022-12-20 RX ADMIN — METHYLPREDNISOLONE SODIUM SUCCINATE SCH MG: 40 INJECTION, POWDER, FOR SOLUTION INTRAMUSCULAR; INTRAVENOUS at 09:52

## 2023-07-07 ENCOUNTER — HOSPITAL ENCOUNTER (OUTPATIENT)
Dept: HOSPITAL 47 - RADMAMWWP | Age: 48
Discharge: HOME | End: 2023-07-07
Attending: INTERNAL MEDICINE
Payer: COMMERCIAL

## 2023-07-07 DIAGNOSIS — Z80.3: ICD-10-CM

## 2023-07-07 DIAGNOSIS — Z78.0: ICD-10-CM

## 2023-07-07 DIAGNOSIS — Z12.31: Primary | ICD-10-CM

## 2023-07-07 PROCEDURE — 77067 SCR MAMMO BI INCL CAD: CPT

## 2023-07-10 NOTE — MM
Reason for Exam: Screening  (asymptomatic). 

Last screening mammogram was performed 12 month(s) ago.





Patient History: 

Menarche at age 14. First Full-Term Pregnancy at age 16. Left ovary removed at age 46. Right ovary

removed at age 46. Postmenopausal. Breast cancer, left, age 37. Hormonal Contraceptives for 2 years,

6 months. Tamoxifen, starting at age 37 for 7 years. 06/05/2012, Mastectomy on the Left side.

11/28/2014, Benign Core Biopsy on the right side. 5/9/2012, Malignant Core Biopsy on the left side.

2012, Implant on the left side. 2012, Implant on the left side.

Maternal aunt had breast cancer, age 50. 





Prior Study Comparison: 

12/3/2020 Right Diagnostic Mammogram, [facility]. 6/16/2021 Right Diagnostic Mammogram, [facility]. 7/6/2022 Right

MG 3D diag mammo w/cad RT, [facility]. 





Tissue Density: 

Right: The breast tissue is heterogeneously dense. This may lower the sensitivity of mammography.





Findings: 

Analyzed By CAD. 

There is no suspicious group of microcalcifications or new suspicious mass in either breast. 





Overall Assessment: Benign, BI-RAD 2





Management: 

Screening Mammogram of the right breast in 1 year.

.



Patient should continue monthly self-breast exams.  A clinical breast exam by your physician is

recommended on an annual basis.

This exam should not preclude additional follow-up of suspicious palpable abnormalities.



Note on Sana scores and lifetime risk:

1. A Sana score greater than 3% is considered moderate risk. If this is the case, consider

specialist referral to assess eligibility for a risk reducing agent.

2. If overall lifetime risk for the development of breast cancer is 20% or higher, the patient may

qualify for future screening with alternating mammogram and breast MRI.



Electronically signed and approved by: Leopold M. Fregoli, M.D. Radiologis

## 2023-07-20 ENCOUNTER — HOSPITAL ENCOUNTER (OUTPATIENT)
Dept: HOSPITAL 47 - RADXRMAIN | Age: 48
Discharge: HOME | End: 2023-07-20
Attending: EMERGENCY MEDICINE
Payer: COMMERCIAL
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
## 2023-07-20 DIAGNOSIS — S60.222A: Primary | ICD-10-CM

## 2023-07-20 DIAGNOSIS — M79.89: ICD-10-CM

## 2023-07-20 NOTE — XR
EXAMINATION TYPE: XR hand complete LT

 

DATE OF EXAM: 7/20/2023 11:40 AM

 

INDICATION: 

Patient age:Female;  48 years old; 

Reason for study: S60.222A contusion L hand; [facility]. 

 

COMPARISON: None

 

TECHNIQUE: Frontal, lateral and oblique views of the left hand were obtained.

 

FINDINGS: Normal alignment of the visualized joints.  No acute osseous pathology is identified. Mild 
soft tissue swelling of the dorsal hand. No radiopaque foreign body.

 

IMPRESSION: 

1.  No acute osseous pathology.

2.  Mild soft tissue swelling of the dorsal hand.

## 2023-08-06 ENCOUNTER — HOSPITAL ENCOUNTER (EMERGENCY)
Dept: HOSPITAL 47 - EC | Age: 48
Discharge: HOME | End: 2023-08-06
Payer: COMMERCIAL

## 2023-08-06 VITALS — HEART RATE: 86 BPM | TEMPERATURE: 97.6 F | RESPIRATION RATE: 18 BRPM

## 2023-08-06 VITALS — SYSTOLIC BLOOD PRESSURE: 124 MMHG | DIASTOLIC BLOOD PRESSURE: 80 MMHG

## 2023-08-06 DIAGNOSIS — M25.462: Primary | ICD-10-CM

## 2023-08-06 DIAGNOSIS — Z88.5: ICD-10-CM

## 2023-08-06 PROCEDURE — 99283 EMERGENCY DEPT VISIT LOW MDM: CPT

## 2023-08-06 PROCEDURE — 96372 THER/PROPH/DIAG INJ SC/IM: CPT

## 2023-08-06 PROCEDURE — 73562 X-RAY EXAM OF KNEE 3: CPT

## 2023-08-06 NOTE — ED
Extremity Problem HPI





- General


Chief complaint: Extremity Problem,Nontraumatic


Stated complaint: Lt knee pain


Time Seen by Provider: 23 08:34


Source: patient, RN notes reviewed


Mode of arrival: ambulatory


Limitations: no limitations





- History of Present Illness


Initial comments: 


This is a 48-year-old female who presents to the emergency department for left 

knee pain.  Patient states that this has been present and worsening over the 

last month.  Pain is worse when she tries to ambulate or put pressure on it. 

States that she hears a crunching sensation when she tries to walk.  Denies any 

injuries.  She's been taking ibuprofen without improvement in symptoms. She has 

not noticed any redness to the leg but has started to develop some swelling.





Denies any fevers, chills, sore throat, cough, dyspnea, chest pain, 

palpitations, abdominal pain, nausea, vomiting, diarrhea, back pain, or 

headaches.





MD Complaint: extremity pain


Onset/Timin


-: month(s)


Location: left, lower extremity





- Related Data


                                Home Medications











 Medication  Instructions  Recorded  Confirmed


 


Semaglutide [Ozempic] 0.5 mg SQ TU 22








                                  Previous Rx's











 Medication  Instructions  Recorded


 


Acetaminophen Tab [Tylenol] 500 mg PO Q4HR PRN  tab 22


 


Albuterol Inhaler [Ventolin Hfa 2 puff INHALATION Q6H PRN 30 Days 22





Inhaler] #1 each 


 


predniSONE 10 mg PO DAILY 6 Days #9 tab 22


 


predniSONE 50 mg PO DAILY 5 Days #5 tab 23











                                    Allergies











Allergy/AdvReac Type Severity Reaction Status Date / Time


 


hydrocodone [From Vicodin] AdvReac  Itching Verified 23 08:28














Review of Systems


ROS Statement: 


Those systems with pertinent positive or pertinent negative responses have been 

documented in the HPI.





ROS Other: All systems not noted in ROS Statement are negative.





Past Medical History


Past Medical History: Cancer


Additional Past Medical History / Comment(s): Breast CA


History of Any Multi-Drug Resistant Organisms: None Reported


Past Surgical History: Breast Surgery,  Section, Hysterectomy, Tubal 

Ligation


Additional Past Surgical History / Comment(s): LEFT MASTECTOMY, partial 

hysterectomy


Past Anesthesia/Blood Transfusion Reactions: No Reported Reaction


Past Psychological History: No Psychological Hx Reported


Smoking Status: Never smoker


Past Alcohol Use History: None Reported


Past Drug Use History: None Reported





General Exam


Limitations: no limitations


General appearance: alert, in no apparent distress


Head exam: Present: atraumatic, normocephalic, normal inspection


Respiratory exam: Present: normal lung sounds bilaterally.  Absent: respiratory 

distress, wheezes, rales, rhonchi, stridor


Cardiovascular Exam: Present: regular rate, normal rhythm, normal heart sounds. 

Absent: systolic murmur, diastolic murmur, rubs, gallop, clicks


Extremities exam: Present: other (Tenderness to palpation over the left patella 

with overlying swelling.  Range of motion limited by pain.  No overlying 

deformities, erythema, or ecchymosis.  No calf tenderness.  2+ DP and PT pulses.

 Capillary refill less than 1 second.)


Neurological exam: Present: alert, oriented X3, CN II-XII intact


Psychiatric exam: Present: normal affect, normal mood


Skin exam: Present: warm, dry, intact, normal color.  Absent: rash





Course


                                   Vital Signs











  23





  08:29 10:14


 


Temperature 97.6 F 97.6 F


 


Pulse Rate 86 86


 


Respiratory 18 18





Rate  


 


Blood Pressure 131/88 124/80


 


O2 Sat by Pulse 97 97





Oximetry  














Medical Decision Making





- Medical Decision Making


This is a 48-year-old female who presents to the emergency department for left 

knee pain.





Was pt. sent in by a medical professional or institution?


@  -No   


Did you speak to anyone other than the patient for history?  


@  -No


Did you review nursing and triage notes? 


@  -Yes, and I agree, it is accurate with regards to the patient's symptoms.


Were old charts reviewed? 


@  -No


Differential Diagnosis? 


@  -Differential Knee Pain:


Fracture, dislocation, sprain, contusion, OA, ACL/LCL/MCL/PCL injury, this is 

not meant to be an all-inclusive list.


EKG interpreted by me (3pts min.)?


@  -Not obtained


X-rays interpreted by me (1pt min.)?


@  -X-ray of the left knee obtained.  My interpretation identifies no acute 

fractures or dislocations.


CT interpreted by me (1pt min.)?


@  -Not obtained


U/S interpreted by me (1pt. min.)?


@  -Not obtained


What testing was considered but not performed? (CT, X-rays, U/S, labs)? Why?


@  -None


What meds were considered but not given? Why?


@  -None


Did you discuss the management of the patient with other professionals?


@  -No


Did you reconcile home meds?


@  -No


Was smoking cessation discussed for >3mins.?


@  -No


Was critical care preformed (if so, how long)?


@  -No


Were there social determinants of health that impacted care today? How? 

(Homelessness, low income, unemployed, alcoholism, drug addiction, 

transportation, low edu. Level, literacy, decrease access to med. care, longterm, 

rehab)?


@  -No


Was there de-escalation of care discussed even if they declined? (Discuss DNR or

withdrawal of care, Hospice)?


@  -No


What co-morbidities impacted this encounter? (DM, HTN, Smoking, COPD, CAD, 

Cancer, CVA, Hep., AIDS, mental health diagnosis, sleep apnea, morbid obesity)?


@  -None


Was patient admitted / discharged?


@  -Discharged.  X-ray of the left knee obtained revealing a small joint 

effusion without any acute osseous abnormalities.  She was given Toradol and 

tramadol for pain control, which was helpful.  She declined a knee immobilizer. 

Prescription for prednisone provided with dosing instructions reviewed.  She is 

instructed to take this with Tylenol for additional relief.  Information for 

orthopedic follow-up provided.  She is instructed to contact them for a follow-

up appointment.


Undiagnosed new problem with uncertain prognosis?


@  -None


Drug Therapy requiring intensive monitoring for toxicity (Heparin, Nitro, 

Insulin, Cardizem)?


@  -None


Were any procedures done?


@  -None


Diagnosis/symptom?


@  -Left knee pain, joint effusion


Acute, or Chronic, or Acute on Chronic?


@  -Acute


Uncomplicated (without systemic symptoms) or Complicated (systemic symptoms)?


@  -Uncomplicated


Side effects of treatment?


@  -None


Exacerbation, Progression, or Severe Exacerbation]


@  -Not applicable


Poses a threat to life or bodily function?


@  -This is making it difficult to ambulate.





Return precautions reviewed in depth, the patient is instructed to return to the

emergency department with any new, worsening, or concerning symptoms. Patient 

verbalized understanding. 





This case was discussed in detail with the attending ED physician, Dr. Mccallum. 

Presentation, findings, and treatment plan discussed in detail as well. 








- Radiology Data


Radiology results: report reviewed, image reviewed





Disposition


Clinical Impression: 


 Left knee pain, Joint effusion of knee





Disposition: HOME SELF-CARE


Instructions (If sedation given, give patient instructions):  Knee Pain (ED)


Additional Instructions: 


Return to the emergency department with any new, worsening, or concerning sy

mptoms.  Take the prednisone daily for 5 days.  You can also take this with 

Tylenol for additional relief.  Contact orthopedics as listed below for a 

follow-up appointment and reevaluation of ongoing symptoms.  Follow up with your

primary care provider in 1-2 days.


Prescriptions: 


predniSONE 50 mg PO DAILY 5 Days #5 tab


Is patient prescribed a controlled substance at d/c from ED?: No


Referrals: 


Farideh Shepard MD [Primary Care Provider] - 1-2 days


Tevin Tan MD [Medical Doctor] - 1-2 days

## 2023-08-06 NOTE — XR
EXAMINATION TYPE: XR knee complete LT

 

DATE OF EXAM: 8/6/2023

 

COMPARISON: None

 

HISTORY: Pain

 

TECHNIQUE: 3 view left knee

 

FINDINGS: No acute fracture or dislocation is evident. Small joint effusion appears to be present. Marlene
int spaces appear preserved

 

Follow-up MRI can be performed to further evaluate soft tissues and the articular surfaces.

 

IMPRESSION:

1.  No acute osseous abnormality left knee.

2. Small joint effusion. 

3. Consider MRI to further evaluate soft tissues.

## 2023-12-08 ENCOUNTER — HOSPITAL ENCOUNTER (EMERGENCY)
Dept: HOSPITAL 47 - EC | Age: 48
Discharge: HOME | End: 2023-12-08
Payer: COMMERCIAL

## 2023-12-08 VITALS
HEART RATE: 98 BPM | SYSTOLIC BLOOD PRESSURE: 132 MMHG | TEMPERATURE: 99.6 F | DIASTOLIC BLOOD PRESSURE: 89 MMHG | RESPIRATION RATE: 18 BRPM

## 2023-12-08 DIAGNOSIS — J02.0: Primary | ICD-10-CM

## 2023-12-08 DIAGNOSIS — Z88.6: ICD-10-CM

## 2023-12-08 DIAGNOSIS — B95.0: ICD-10-CM

## 2023-12-08 DIAGNOSIS — Z20.822: ICD-10-CM

## 2023-12-08 PROCEDURE — 99284 EMERGENCY DEPT VISIT MOD MDM: CPT

## 2023-12-08 PROCEDURE — 87651 STREP A DNA AMP PROBE: CPT

## 2023-12-08 PROCEDURE — 87636 SARSCOV2 & INF A&B AMP PRB: CPT

## 2023-12-08 NOTE — ED
General Adult HPI





- General


Chief complaint: Upper Respiratory Infection


Stated complaint: Sore Throat, Headache, Body Ache, Fever


Time Seen by Provider: 23 02:38


Source: patient, RN notes reviewed


Mode of arrival: ambulatory


Limitations: no limitations





- History of Present Illness


Initial comments: 





48-year-old  female with no significant past medical history presents 

the emergency department with a chief complaint of sore throat.  Patient had a 

worsening sore throat for the last couple of days.  She denies any known recent 

sick contacts.  She does endorse that she also had generalized fatigue and body 

aches.  Denies any provoking breathing.  She's been trying over-the-counter 

remedies with no symptomatic improvement.





- Related Data


                                Home Medications











 Medication  Instructions  Recorded  Confirmed


 


Semaglutide [Ozempic] 0.5 mg SQ TU 22








                                  Previous Rx's











 Medication  Instructions  Recorded


 


Acetaminophen Tab [Tylenol] 500 mg PO Q4HR PRN  tab 22


 


Albuterol Inhaler [Ventolin Hfa 2 puff INHALATION Q6H PRN 30 Days 22





Inhaler] #1 each 


 


predniSONE 10 mg PO DAILY 6 Days #9 tab 22


 


predniSONE 50 mg PO DAILY 5 Days #5 tab 23


 


Amoxicillin 875 mg PO Q12HR #20 tablet 23











                                    Allergies











Allergy/AdvReac Type Severity Reaction Status Date / Time


 


hydrocodone [From Vicodin] AdvReac  Itching Verified 23 02:46














Review of Systems


ROS Statement: 


Those systems with pertinent positive or pertinent negative responses have been 

documented in the HPI.





ROS Other: All systems not noted in ROS Statement are negative.





Past Medical History


Past Medical History: Cancer


Additional Past Medical History / Comment(s): Breast CA


History of Any Multi-Drug Resistant Organisms: None Reported


Past Surgical History: Breast Surgery,  Section, Hysterectomy, Tubal 

Ligation


Additional Past Surgical History / Comment(s): LEFT MASTECTOMY, partial 

hysterectomy


Past Anesthesia/Blood Transfusion Reactions: No Reported Reaction


Past Psychological History: No Psychological Hx Reported


Smoking Status: Never smoker


Past Alcohol Use History: None Reported


Past Drug Use History: None Reported





General Exam





- General Exam Comments


Initial Comments: 





General: Alert, in no acute distress


Head: atraumatic normocephalic.  Eyes PERRL, EOMI intact, mucous membranes moist




Respiratory: Lungs clear to auscultation bilaterally


Cardiovascular: Regular rate and rhythm 


Abdominal: Soft without guarding or rebound


Extremities: Normal inspection with full range of motion and normal capillary 

refill


Neuroogic: alert and oriented 3, CN II-XII intact, able to ambulate with steady

gait 


Skin: warm dry and intact with normal color


Limitations: no limitations





Course


                                   Vital Signs











  23





  02:42 04:13


 


Temperature 99.3 F 99.6 F


 


Pulse Rate 114 H 98


 


Respiratory 20 18





Rate  


 


Blood Pressure 123/88 132/89


 


O2 Sat by Pulse 97 96





Oximetry  














Medical Decision Making





- Medical Decision Making





Was pt. sent in by a medical professional or institution (, PA, NP, urgent 

care, hospital, or nursing home...) When possible be specific


@  -[No]


Did you speak to anyone other than the patient for history (EMS, parent, family,

 police, friend...)? What history was obtained from this source 


@  -[No]


Did you review nursing and triage notes (agree or disagree)?  Why? 


@  -[I reviewed and agree with nursing and triage notes]


Were old charts reviewed (outside hosp., previous admission, EMS record, old 

EKG, old radiological studies, urgent care reports/EKG's, nursing home records)?

Report findings 


@  -[No old charts were reviewed]


Differential Diagnosis (chest pain, altered mental status, abdominal pain women,

abdominal pain men, vaginal bleeding, weakness, fever, dyspnea, syncope, 

headache, dizziness, GI bleed, back pain, seizure, CVA, palpatations, mental 

health, musculoskeletal)? 


@  -[not applicable]


EKG interpreted by me (3pts min.).


@  -[As above]


X-rays interpreted by me (1pt min.).


@  -[None done]


CT interpreted by me (1pt min.).


@  -[None done]


U/S interpreted by me (1pt. min.).


@  -[None done]


What testing was considered but not performed or refused? (CT, X-rays, U/S, 

labs)? Why?


@  -[None]


What meds were considered but not given or refused? Why?


@  -[None]


Did you discuss the management of the patient with other professionals 

(professionals i.e. , PA, NP, lab, RT, psych nurse, , , 

teacher, , )? Give summary


@  -[No]


Was smoking cessation discussed for >3mins.?


@  -[No]


Was critical care preformed (if so, how long)?


@  -[No]


Were there social determinants of health that impacted care today? How? 

(Homelessness, low income, unemployed, alcoholism, drug addiction, 

transportation, low edu. Level, literacy, decrease access to med. care, custodial, 

rehab)?


@  -[No]


Was there de-escalation of care discussed even if they declined (Discuss DNR or 

withdrawal of care, Hospice)? DNR status


@  -[No]


What co-morbidities impacted this encounter? (DM, HTN, Smoking, COPD, CAD, 

Cancer, CVA, ARF, Chemo, Hep., AIDS, mental health diagnosis, sleep apnea, 

morbid obesity)?


@  -[None]


Was patient admitted / discharged? Hospital course, mention meds given and 

route, prescriptions, significant lab abnormalities, going to OR and other 

pertinent info.


@  Patient is a 48-year-old  female who presents the emergency 

department with sore throat.  Physical exam without hepatosplenomegaly or 

exudate.  Patient strep positive.  Patient provided amoxicillin in the ED.  

Patient is a prescription for amoxicillin.  Return precautions discussed at 

length.  Discharged in stable condition.  Case discussed with Dr. Yoon BLAIR 

who agrees with plan of care


Undiagnosed new problem with uncertain prognosis?


@  -[No]


Drug Therapy requiring intensive monitoring for toxicity (Heparin, Nitro, 

Insulin, Cardizem)?


@  -[No]


Were any procedures done?


@  -[No]


Diagnosis/symptom?


@  -Strep throat 


Acute, or Chronic, or Acute on Chronic?


@  -Acute 


Uncomplicated (without systemic symptoms) or Complicated (systemic symptoms)?


@  -Uncomplicated


Side effects of treatment?


@  -[No]


Exacerbation, Progression, or Severe Exacerbation?


@  -[No]


Poses a threat to life or bodily function? How? (Chest pain, USA, MI, pneumonia,

 PE, COPD, DKA, ARF, appy, cholecystitis, CVA, Diverticulitis, Homicidal, 

Suicidal, threat to staff... and all critical care pts)


@  -Low likleihood 





- Lab Data


                                   Lab Results











  23 Range/Units





  02:47 02:47 


 


Influenza Type A (PCR)   Not Detected  (Not Detectd)  


 


Influenza Type B (PCR)   Not Detected  (Not Detectd)  


 


RSV (PCR)   Not Detected  (Not Detectd)  


 


SARS-CoV-2 (PCR)   Not Detected  (Not Detectd)  


 


Group A Strep (PCR)  DETECTED A   (Not Detectd)  














Disposition


Clinical Impression: 


 Strep throat





Disposition: HOME SELF-CARE


Condition: Stable


Instructions (If sedation given, give patient instructions):  Strep Throat (ED)


Additional Instructions: 


Please take ABX as prescribed 





Please return to the nearest emergency department if worsening symptoms develop


Prescriptions: 


Amoxicillin 875 mg PO Q12HR #20 tablet


Is patient prescribed a controlled substance at d/c from ED?: No


Referrals: 


Farideh Shepard MD [Primary Care Provider] - 1-2 days


Time of Disposition: 03:57

## 2024-07-08 ENCOUNTER — HOSPITAL ENCOUNTER (OUTPATIENT)
Dept: HOSPITAL 47 - RADMAMWWP | Age: 49
Discharge: HOME | End: 2024-07-08
Attending: INTERNAL MEDICINE
Payer: COMMERCIAL

## 2024-07-08 DIAGNOSIS — Z80.3: ICD-10-CM

## 2024-07-08 DIAGNOSIS — Z78.0: ICD-10-CM

## 2024-07-08 DIAGNOSIS — Z12.31: Primary | ICD-10-CM

## 2024-07-08 PROCEDURE — 77067 SCR MAMMO BI INCL CAD: CPT

## 2024-07-10 NOTE — MM
Reason for Exam: Screening  (asymptomatic). 

Last screening mammogram was performed 12 month(s) ago.





Patient History: 

Menarche at age 14. First Full-Term Pregnancy at age 16. Left ovary removed at age 46. Right ovary

removed at age 46. Postmenopausal. Breast cancer, left, age 37. Hormonal Contraceptives for 2 years,

6 months. Tamoxifen, starting at age 37 for 7 years. 06/05/2012, Mastectomy on the Left side.

11/28/2014, Benign Core Biopsy on the right side. 5/9/2012, Malignant Core Biopsy on the left side.

2012, Implant on the left side. 2012, Implant on the left side.

Maternal aunt had breast cancer, age 50. 





Prior Study Comparison: 

6/16/2021 Right Diagnostic Mammogram, [facility]. 7/6/2022 Right MG 3D diag mammo w/cad RT, [facility]. 7/7/2023

Right Screening 3D/Tomosynthesis, [facility]. 





Tissue Density: 

Right: There are scattered areas of fibroglandular density.





Findings: 

Analyzed By CAD. 

Right breast: There is no suspicious group of microcalcifications or new suspicious mass.



Left breast: There is no suspicious group of microcalcifications or new suspicious mass. 





Overall Assessment: Negative, BI-RAD 1





Management: 

Screening Mammogram of both breasts in 1 year.

[facility] will attempt to contact patient to return for supplemental views and

ultrasound if indicated.



Patient should continue monthly self-breast exams.  A clinical breast exam by your physician is

recommended on an annual basis.

This exam should not preclude additional follow-up of suspicious palpable abnormalities.



Note on Sana scores and lifetime risk:

1. A Sana score greater than 3% is considered moderate risk. If this is the case, consider

specialist referral to assess eligibility for a risk reducing agent.

2. If overall lifetime risk for the development of breast cancer is 20% or higher, the patient may

qualify for future screening with alternating mammogram and breast MRI.



Electronically signed and approved by: Rene Neri DO

## 2024-08-08 ENCOUNTER — HOSPITAL ENCOUNTER (OUTPATIENT)
Dept: HOSPITAL 47 - LABPRL | Age: 49
Discharge: HOME | End: 2024-08-08
Attending: FAMILY MEDICINE
Payer: COMMERCIAL

## 2024-08-08 DIAGNOSIS — E11.9: Primary | ICD-10-CM

## 2024-08-08 PROCEDURE — 84443 ASSAY THYROID STIM HORMONE: CPT

## 2024-08-08 PROCEDURE — 80053 COMPREHEN METABOLIC PANEL: CPT

## 2024-08-08 PROCEDURE — 85025 COMPLETE CBC W/AUTO DIFF WBC: CPT

## 2024-08-08 PROCEDURE — 83036 HEMOGLOBIN GLYCOSYLATED A1C: CPT

## 2024-08-08 PROCEDURE — 80061 LIPID PANEL: CPT

## 2025-01-15 ENCOUNTER — HOSPITAL ENCOUNTER (EMERGENCY)
Dept: HOSPITAL 47 - EC | Age: 50
Discharge: HOME | End: 2025-01-15
Payer: COMMERCIAL

## 2025-01-15 VITALS — SYSTOLIC BLOOD PRESSURE: 111 MMHG | DIASTOLIC BLOOD PRESSURE: 73 MMHG | HEART RATE: 62 BPM | TEMPERATURE: 98.1 F

## 2025-01-15 VITALS — RESPIRATION RATE: 18 BRPM

## 2025-01-15 DIAGNOSIS — Z88.5: ICD-10-CM

## 2025-01-15 DIAGNOSIS — R11.0: ICD-10-CM

## 2025-01-15 DIAGNOSIS — R51.9: Primary | ICD-10-CM

## 2025-01-15 LAB
ALBUMIN SERPL-MCNC: 3.9 G/DL (ref 3.5–5)
ALP SERPL-CCNC: 73 U/L (ref 38–126)
ALT SERPL-CCNC: 20 U/L (ref 4–34)
AMYLASE SERPL-CCNC: 53 U/L (ref 30–110)
ANION GAP SERPL CALC-SCNC: 6 MMOL/L
AST SERPL-CCNC: 19 U/L (ref 14–36)
BASOPHILS # BLD AUTO: 0 K/UL (ref 0–0.2)
BASOPHILS NFR BLD AUTO: 1 %
BUN SERPL-SCNC: 21 MG/DL (ref 7–17)
CALCIUM SPEC-MCNC: 9.2 MG/DL (ref 8.4–10.2)
CHLORIDE SERPL-SCNC: 105 MMOL/L (ref 98–107)
CO2 SERPL-SCNC: 26 MMOL/L (ref 22–30)
EOSINOPHIL # BLD AUTO: 0.2 K/UL (ref 0–0.7)
EOSINOPHIL NFR BLD AUTO: 3 %
ERYTHROCYTE [DISTWIDTH] IN BLOOD BY AUTOMATED COUNT: 4.77 M/UL (ref 3.8–5.4)
ERYTHROCYTE [DISTWIDTH] IN BLOOD: 12.4 % (ref 11.5–15.5)
GLUCOSE SERPL-MCNC: 88 MG/DL (ref 74–99)
HCT VFR BLD AUTO: 42.3 % (ref 34–46)
HGB BLD-MCNC: 13.7 GM/DL (ref 11.4–16)
LIPASE SERPL-CCNC: 128 U/L (ref 23–300)
LYMPHOCYTES # SPEC AUTO: 3 K/UL (ref 1–4.8)
LYMPHOCYTES NFR SPEC AUTO: 40 %
MCH RBC QN AUTO: 28.7 PG (ref 25–35)
MCHC RBC AUTO-ENTMCNC: 32.4 G/DL (ref 31–37)
MCV RBC AUTO: 88.7 FL (ref 80–100)
MONOCYTES # BLD AUTO: 0.5 K/UL (ref 0–1)
MONOCYTES NFR BLD AUTO: 7 %
NEUTROPHILS # BLD AUTO: 3.6 K/UL (ref 1.3–7.7)
NEUTROPHILS NFR BLD AUTO: 49 %
PLATELET # BLD AUTO: 344 K/UL (ref 150–450)
POTASSIUM SERPL-SCNC: 4.4 MMOL/L (ref 3.5–5.1)
PROT SERPL-MCNC: 6.8 G/DL (ref 6.3–8.2)
SODIUM SERPL-SCNC: 137 MMOL/L (ref 137–145)
WBC # BLD AUTO: 7.5 K/UL (ref 3.8–10.6)

## 2025-01-15 PROCEDURE — 85025 COMPLETE CBC W/AUTO DIFF WBC: CPT

## 2025-01-15 PROCEDURE — 96361 HYDRATE IV INFUSION ADD-ON: CPT

## 2025-01-15 PROCEDURE — 82150 ASSAY OF AMYLASE: CPT

## 2025-01-15 PROCEDURE — 83690 ASSAY OF LIPASE: CPT

## 2025-01-15 PROCEDURE — 96374 THER/PROPH/DIAG INJ IV PUSH: CPT

## 2025-01-15 PROCEDURE — 99284 EMERGENCY DEPT VISIT MOD MDM: CPT

## 2025-01-15 PROCEDURE — 80053 COMPREHEN METABOLIC PANEL: CPT

## 2025-01-15 PROCEDURE — 96375 TX/PRO/DX INJ NEW DRUG ADDON: CPT

## 2025-01-15 PROCEDURE — 36415 COLL VENOUS BLD VENIPUNCTURE: CPT

## 2025-01-15 RX ADMIN — FAMOTIDINE STA MG: 10 INJECTION, SOLUTION INTRAVENOUS at 12:07

## 2025-01-15 RX ADMIN — METOCLOPRAMIDE STA MG: 5 INJECTION, SOLUTION INTRAMUSCULAR; INTRAVENOUS at 11:51

## 2025-01-15 RX ADMIN — ACETAMINOPHEN STA MLS/HR: 10 INJECTION, SOLUTION INTRAVENOUS at 12:06

## 2025-01-15 RX ADMIN — CEFAZOLIN ONE MLS/HR: 330 INJECTION, POWDER, FOR SOLUTION INTRAMUSCULAR; INTRAVENOUS at 11:55

## 2025-01-15 NOTE — ED
General Adult HPI





- General


Chief complaint: Headache


Stated complaint: nausea


Time Seen by Provider: 01/15/25 11:10


Source: patient, RN notes reviewed


Mode of arrival: ambulatory


Limitations: no limitations





- History of Present Illness


Initial comments: 





Patient is a 49-year-old female present to the emergency department with not 

feeling well since yesterday.  Patient has had 2 episodes of diarrhea.  Patient 

has nausea and decreased oral intake.  No vomiting.  Patient has been very 

fatigued and sleepy.  Patient also has a headache.  No upper respiratory 

symptoms.  Patient has had chills.





- Related Data


                                Home Medications











 Medication  Instructions  Recorded  Confirmed


 


Semaglutide [Ozempic] 0.5 mg SQ TU 22








                                  Previous Rx's











 Medication  Instructions  Recorded


 


Acetaminophen Tab [Tylenol] 500 mg PO Q4HR PRN  tab 22


 


Albuterol Inhaler [Ventolin Hfa 2 puff INHALATION Q6H PRN 30 Days 22





Inhaler] #1 each 


 


predniSONE 10 mg PO DAILY 6 Days #9 tab 22


 


predniSONE 50 mg PO DAILY 5 Days #5 tab 23


 


Amoxicillin 875 mg PO Q12HR #20 tablet 23


 


Ondansetron Odt [Zofran Odt] 4 mg PO Q8HR PRN #10 tab 01/15/25











                                    Allergies











Allergy/AdvReac Type Severity Reaction Status Date / Time


 


hydrocodone [From Vicodin] AdvReac  Itching Verified 01/15/25 11:06














Review of Systems


ROS Statement: 


Those systems with pertinent positive or pertinent negative responses have been 

documented in the HPI.





ROS Other: All systems not noted in ROS Statement are negative.


Constitutional: Reports: chills


Eyes: Denies: eye pain


ENT: Denies: ear pain, congestion


Respiratory: Denies: cough, dyspnea


Cardiovascular: Denies: chest pain


Endocrine: Denies: fatigue


Gastrointestinal: Reports: nausea, diarrhea.  Denies: vomiting


Neurological: Reports: headache.  Denies: weakness, confusion





Past Medical History


Past Medical History: Cancer


Additional Past Medical History / Comment(s): Breast CA


History of Any Multi-Drug Resistant Organisms: None Reported


Past Surgical History: Breast Surgery,  Section, Hysterectomy, Tubal 

Ligation


Additional Past Surgical History / Comment(s): LEFT MASTECTOMY, partial 

hysterectomy


Past Anesthesia/Blood Transfusion Reactions: No Reported Reaction


Past Psychological History: No Psychological Hx Reported


Smoking Status: Never smoker


Past Alcohol Use History: None Reported


Past Drug Use History: None Reported





General Exam


Limitations: no limitations


General appearance: alert, in no apparent distress


Head exam: Present: normocephalic


Eye exam: Present: normal appearance, PERRL, EOMI


ENT exam: Present: normal oropharynx


Neck exam: Present: normal inspection.  Absent: meningismus


Respiratory exam: Present: normal lung sounds bilaterally


Cardiovascular Exam: Present: regular rate, normal rhythm


GI/Abdominal exam: Present: soft.  Absent: tenderness


Extremities exam: Present: normal inspection


Neurological exam: Present: alert, oriented X3, CN II-XII intact.  Absent: motor

sensory deficit


  ** Expanded


Neurological exam: Present: protecting the airway


Speech: Present: fluid speech


Cranial nerves: EOM's Intact: Normal


Motor strength exam: RUE: 5, LUE: 5, RLE: 5, LLE: 5


Eye Response: (4) open spontaneously


Motor Response: (6) obeys commands


Verbal Response: (5) oriented


Psychiatric exam: Present: normal affect, normal mood


Skin exam: Present: normal color





Course


                                   Vital Signs











  01/15/25





  11:02


 


Temperature 97.9 F


 


Pulse Rate 72


 


Respiratory 18





Rate 


 


Blood Pressure 120/79


 


O2 Sat by Pulse 98





Oximetry 














Medical Decision Making





- Medical Decision Making





Was pt. sent in by a medical professional or institution (JENELLE Bermeo, NP, urgent 

care, hospital, or nursing home...) When possible be specific


@  -No


Did you speak to anyone other than the patient for history (EMS, parent, family,

police, friend...)? What history was obtained from this source 


@  -No


Did you review nursing and triage notes (agree or disagree)?  Why? 


@  -I reviewed and agree with nursing and triage notes


Were old charts reviewed (outside hosp., previous admission, EMS record, old 

EKG, old radiological studies, urgent care reports/EKG's, nursing home records)?

Report findings 


@  -No old charts were reviewed


Differential Diagnosis (chest pain, altered mental status, abdominal pain women,

abdominal pain men, vaginal bleeding, weakness, fever, dyspnea, syncope, 

headache, dizziness, GI bleed, back pain, seizure, CVA, palpatations, mental 

health, musculoskeletal)? 


@  -Differential Abdominal Pain Women:


Appendicitis, Cholecystitis, diverticulosis, ischemic bowel, pancreatitis, 

hepatitis, UTI, gastroenteritis, AAA, incarcerated hernia, bowel obstruction, 

constipation, inflammatory bowel, hepatitis, peptic ulcer disease, splenic 

infarction, perforated viscus, vulvitis, ovarian torsion, PID, kidney stone, 

placenta abruption, this is not meant to be an all-inclusive list





EKG interpreted by me (3pts min.).


@  -As above


X-rays interpreted by me (1pt min.).


@  -None done


CT interpreted by me (1pt min.).


@  -None done


U/S interpreted by me (1pt. min.).


@  -None done


What testing was considered but not performed or refused? (CT, X-rays, U/S, 

labs)? Why?


@  -None


What meds were considered but not given or refused? Why?


@  -None


Did you discuss the management of the patient with other professionals 

(professionals i.e. , PA, NP, lab, RT, psych nurse, , , 

teacher, , )? Give summary


@  -No


Was smoking cessation discussed for >3mins.?


@  -No


Was critical care preformed (if so, how long)?


@  -No


Were there social determinants of health that impacted care today? How? 

(Homelessness, low income, unemployed, alcoholism, drug addiction, 

transportation, low edu. Level, literacy, decrease access to med. care, snf, 

rehab)?


@  -No


Was there de-escalation of care discussed even if they declined (Discuss DNR or 

withdrawal of care, Hospice)? DNR status


@  -No


What co-morbidities impacted this encounter? (DM, HTN, Smoking, COPD, CAD, 

Cancer, CVA, ARF, Chemo, Hep., AIDS, mental health diagnosis, sleep apnea, 

morbid obesity)?


@  -None


Was patient admitted / discharged? Hospital course, mention meds given and 

route, prescriptions, significant lab abnormalities, going to OR and other 

pertinent info.


@  -Patient presents with diarrhea and nausea with associated decreased oral 

intake and headache.  Patient given medication and on reevaluation headache has 

resolved and patient feels significantly better.  Patient is updated on results 

and need for follow-up.


Undiagnosed new problem with uncertain prognosis?


@  -No


Drug Therapy requiring intensive monitoring for toxicity (Heparin, Nitro, 

Insulin, Cardizem)?


@  -No


Were any procedures done?


@  -No


Diagnosis/symptom?


@  -Nausea, headache


Acute, or Chronic, or Acute on Chronic?


@  -Acute, acute


Uncomplicated (without systemic symptoms) or Complicated (systemic symptoms)?


@  -Default


Side effects of treatment?


@  -No


Exacerbation, Progression, or Severe Exacerbation?


@  -No


Poses a threat to life or bodily function? How? (Chest pain, USA, MI, pneumonia,

PE, COPD, DKA, ARF, appy, cholecystitis, CVA, Diverticulitis, Homicidal, 

Suicidal, threat to staff... and all critical care pts)


@  -No








- Lab Data


Result diagrams: 


                                 01/15/25 12:04





                                 01/15/25 12:04


                                   Lab Results











  01/15/25 01/15/25 Range/Units





  12:04 12:04 


 


WBC  7.5   (3.8-10.6)  k/uL


 


RBC  4.77   (3.80-5.40)  m/uL


 


Hgb  13.7   (11.4-16.0)  gm/dL


 


Hct  42.3   (34.0-46.0)  %


 


MCV  88.7   (80.0-100.0)  fL


 


MCH  28.7   (25.0-35.0)  pg


 


MCHC  32.4   (31.0-37.0)  g/dL


 


RDW  12.4   (11.5-15.5)  %


 


Plt Count  344   (150-450)  k/uL


 


MPV  6.9   


 


Neutrophils %  49   %


 


Lymphocytes %  40   %


 


Monocytes %  7   %


 


Eosinophils %  3   %


 


Basophils %  1   %


 


Neutrophils #  3.6   (1.3-7.7)  k/uL


 


Lymphocytes #  3.0   (1.0-4.8)  k/uL


 


Monocytes #  0.5   (0-1.0)  k/uL


 


Eosinophils #  0.2   (0-0.7)  k/uL


 


Basophils #  0.0   (0-0.2)  k/uL


 


Sodium   137  (137-145)  mmol/L


 


Potassium   4.4  (3.5-5.1)  mmol/L


 


Chloride   105  ()  mmol/L


 


Carbon Dioxide   26  (22-30)  mmol/L


 


Anion Gap   6  mmol/L


 


BUN   21 H  (7-17)  mg/dL


 


Creatinine   0.85  (0.52-1.04)  mg/dL


 


Est GFR (CKD-EPI)AfAm   >90  (>60 ml/min/1.73 sqM)  


 


Est GFR (CKD-EPI)NonAf   80  (>60 ml/min/1.73 sqM)  


 


Glucose   88  (74-99)  mg/dL


 


Calcium   9.2  (8.4-10.2)  mg/dL


 


Total Bilirubin   0.9  (0.2-1.3)  mg/dL


 


AST   19  (14-36)  U/L


 


ALT   20  (4-34)  U/L


 


Alkaline Phosphatase   73  ()  U/L


 


Total Protein   6.8  (6.3-8.2)  g/dL


 


Albumin   3.9  (3.5-5.0)  g/dL


 


Amylase   53  ()  U/L


 


Lipase   128  ()  U/L














Disposition


Clinical Impression: 


 Nausea, Headache





Disposition: HOME SELF-CARE


Condition: Stable


Instructions (If sedation given, give patient instructions):  Acute Headache 

(ED)


Additional Instructions: 


Prescription for nausea medicine sent to pharmacy.  Please follow-up with your 

primary care physician in the next couple of days for recheck.  Return for 

fevers, increased headache, not tolerating oral intake, worsening symptoms or 

other concerns.


Prescriptions: 


Ondansetron Odt [Zofran Odt] 4 mg PO Q8HR PRN #10 tab


 PRN Reason: Nausea


Is patient prescribed a controlled substance at d/c from ED?: No


Referrals: 


Farideh Shepard MD [Primary Care Provider] - 1-2 days


Time of Disposition: 13:02

## 2025-07-09 ENCOUNTER — HOSPITAL ENCOUNTER (OUTPATIENT)
Dept: HOSPITAL 47 - RADMAMWWP | Age: 50
Discharge: HOME | End: 2025-07-09
Attending: INTERNAL MEDICINE
Payer: COMMERCIAL

## 2025-07-09 DIAGNOSIS — Z80.3: ICD-10-CM

## 2025-07-09 DIAGNOSIS — Z71.3: ICD-10-CM

## 2025-07-09 DIAGNOSIS — Z17.0: ICD-10-CM

## 2025-07-09 DIAGNOSIS — Z78.0: ICD-10-CM

## 2025-07-09 DIAGNOSIS — C50.412: ICD-10-CM

## 2025-07-09 DIAGNOSIS — Z12.31: Primary | ICD-10-CM

## 2025-07-09 DIAGNOSIS — R92.321: ICD-10-CM

## 2025-07-09 PROCEDURE — 77067 SCR MAMMO BI INCL CAD: CPT

## 2025-07-09 PROCEDURE — 77063 BREAST TOMOSYNTHESIS BI: CPT
